# Patient Record
Sex: FEMALE | Race: WHITE | NOT HISPANIC OR LATINO | URBAN - METROPOLITAN AREA
[De-identification: names, ages, dates, MRNs, and addresses within clinical notes are randomized per-mention and may not be internally consistent; named-entity substitution may affect disease eponyms.]

---

## 2017-02-16 ENCOUNTER — APPOINTMENT (RX ONLY)
Dept: URBAN - METROPOLITAN AREA CLINIC 156 | Facility: CLINIC | Age: 62
Setting detail: DERMATOLOGY
End: 2017-02-16

## 2017-02-16 DIAGNOSIS — Z41.9 ENCOUNTER FOR PROCEDURE FOR PURPOSES OTHER THAN REMEDYING HEALTH STATE, UNSPECIFIED: ICD-10-CM

## 2017-02-16 PROCEDURE — ? FILLERS

## 2017-02-16 PROCEDURE — ? BOTOX

## 2017-02-16 NOTE — PROCEDURE: FILLERS
Tear Troughs Filler  Volume In Cc: 0
Use Map Statement For Sites (Optional): No
Filler: Juvederm Ultra Plus XC
Post-Care Instructions: Patient instructed to apply ice to reduce swelling.
Additional Anesthesia Volume In Cc: 6
Consent: Written consent obtained. Risks include but not limited to bruising, beading, irregular texture, ulceration, infection, allergic reaction, scar formation, incomplete augmentation, temporary nature, procedural pain.
Detail Level: Detailed
Map Statment: See Attach Map for Complete Details
Anesthesia Volume In Cc: 0.5
Topical Anesthesia?: 23% lidocaine, 7% tetracaine
Expiration Date (Month Year): 9/18
Lot #: [id number]

## 2017-02-16 NOTE — PROCEDURE: BOTOX
Dilution (U/0.1 Cc): 2.5
Additional Area 1 Units: 0
Detail Level: Detailed
Glabellar Complex Units: 8
Post-Care Instructions: Patient instructed to not lie down for 4 hours and limit physical activity for 24 hours. Patient instructed not to travel by airplane for 48 hours.
Reconstitution Date (Optional): 2/16/17
Expiration Date (Month Year): 9/19
Inferior Lateral Orbicularis Oculi Units: 7
Forehead Units: 4.5
Consent: Written consent obtained. Risks include but not limited to lid/brow ptosis, bruising, swelling, diplopia, temporary effect, incomplete chemical denervation.
Lot #: [id number]

## 2017-04-27 ENCOUNTER — APPOINTMENT (RX ONLY)
Dept: URBAN - METROPOLITAN AREA CLINIC 156 | Facility: CLINIC | Age: 62
Setting detail: DERMATOLOGY
End: 2017-04-27

## 2017-04-27 DIAGNOSIS — Z41.9 ENCOUNTER FOR PROCEDURE FOR PURPOSES OTHER THAN REMEDYING HEALTH STATE, UNSPECIFIED: ICD-10-CM

## 2017-04-27 PROCEDURE — ? BOTOX

## 2017-04-27 PROCEDURE — ? FILLERS

## 2017-04-27 NOTE — PROCEDURE: BOTOX
Inferior Lateral Orbicularis Oculi Units: 0
Forehead Units: 5
Detail Level: Detailed
Dilution (U/0.1 Cc): 4
Reconstitution Date (Optional): 4/25/17
Expiration Date (Month Year): 9/18
Lot #: [id number]
Consent: Written consent obtained. Risks include but not limited to lid/brow ptosis, bruising, swelling, diplopia, temporary effect, incomplete chemical denervation.
Post-Care Instructions: Patient instructed to not lie down for 4 hours and limit physical activity for 24 hours. Patient instructed not to travel by airplane for 48 hours.

## 2017-04-27 NOTE — PROCEDURE: FILLERS
Detail Level: Detailed
Map Statment: See Attach Map for Complete Details
Use Map Statement For Sites (Optional): No
Additional Anesthesia Volume In Cc: 6
Topical Anesthesia?: 23% lidocaine, 7% tetracaine
Filler: Juvederm Ultra Plus XC
Lateral Face Filler  Volume In Cc: 0
Marionette Lines Filler  Volume In Cc: 0.8
Nasolabial Folds Filler Volume In Cc: 0.2
Expiration Date (Month Year): 07/18
Post-Care Instructions: Patient instructed to apply ice to reduce swelling.
Lot #: [id number]
Consent: Written consent obtained. Risks include but not limited to bruising, beading, irregular texture, ulceration, infection, allergic reaction, scar formation, incomplete augmentation, temporary nature, procedural pain.

## 2017-10-05 ENCOUNTER — APPOINTMENT (RX ONLY)
Dept: URBAN - METROPOLITAN AREA CLINIC 156 | Facility: CLINIC | Age: 62
Setting detail: DERMATOLOGY
End: 2017-10-05

## 2017-10-05 DIAGNOSIS — Z41.9 ENCOUNTER FOR PROCEDURE FOR PURPOSES OTHER THAN REMEDYING HEALTH STATE, UNSPECIFIED: ICD-10-CM

## 2017-10-05 PROCEDURE — ? BOTOX

## 2017-10-05 PROCEDURE — ? FILLERS

## 2017-10-05 NOTE — PROCEDURE: FILLERS
Additional Area 3 Volume In Cc: 0
Lot #: [id number]
Use Map Statement For Sites (Optional): No
Nasolabial Folds Filler Volume In Cc: 0.4
Map Statment: See Attach Map for Complete Details
Post-Care Instructions: Patient instructed to apply ice to reduce swelling.
Filler: Juvederm Vollure XC
Anesthesia Volume In Cc: 0.5
Consent: Written consent obtained. Risks include but not limited to bruising, beading, irregular texture, ulceration, infection, allergic reaction, scar formation, incomplete augmentation, temporary nature, procedural pain.
Additional Anesthesia Volume In Cc: 6
Detail Level: Detailed
Expiration Date (Month Year): 3/12/19
Marionette Lines Filler  Volume In Cc: 0.6

## 2017-10-05 NOTE — PROCEDURE: BOTOX
Additional Area 2 Units: 0
Forehead Units: 4.5
Dilution (U/0.1 Cc): 2.5
Glabellar Complex Units: 12.5
Post-Care Instructions: Patient instructed to not lie down for 4 hours and limit physical activity for 24 hours. Patient instructed not to travel by airplane for 48 hours.
Detail Level: Detailed
Consent: Written consent obtained. Risks include but not limited to lid/brow ptosis, bruising, swelling, diplopia, temporary effect, incomplete chemical denervation.

## 2018-02-22 ENCOUNTER — APPOINTMENT (RX ONLY)
Dept: URBAN - METROPOLITAN AREA CLINIC 156 | Facility: CLINIC | Age: 63
Setting detail: DERMATOLOGY
End: 2018-02-22

## 2018-02-22 DIAGNOSIS — Z41.9 ENCOUNTER FOR PROCEDURE FOR PURPOSES OTHER THAN REMEDYING HEALTH STATE, UNSPECIFIED: ICD-10-CM

## 2018-02-22 PROCEDURE — ? BOTOX

## 2018-02-22 PROCEDURE — ? FILLERS

## 2018-02-22 NOTE — PROCEDURE: BOTOX
Glabellar Complex Units: 12.5
Additional Area 5 Units: 0
Post-Care Instructions: Patient instructed to not lie down for 4 hours and limit physical activity for 24 hours. Patient instructed not to travel by airplane for 48 hours.
Detail Level: Detailed
Consent: Written consent obtained. Risks include but not limited to lid/brow ptosis, bruising, swelling, diplopia, temporary effect, incomplete chemical denervation.
Dilution (U/0.1 Cc): 2.5
Forehead Units: 4.5

## 2018-02-22 NOTE — PROCEDURE: FILLERS
Tear Troughs Filler  Volume In Cc: 0
Detail Level: Detailed
Nasolabial Folds Filler Volume In Cc: 0.2
Anesthesia Volume In Cc: 0.5
Filler: Juvederm Vollure XC
Post-Care Instructions: Patient instructed to apply ice to reduce swelling.
Use Map Statement For Sites (Optional): No
Additional Anesthesia Volume In Cc: 6
Marionette Lines Filler  Volume In Cc: 0.8
Consent: Written consent obtained. Risks include but not limited to bruising, beading, irregular texture, ulceration, infection, allergic reaction, scar formation, incomplete augmentation, temporary nature, procedural pain.
Map Statment: See Attach Map for Complete Details

## 2018-03-19 ENCOUNTER — APPOINTMENT (RX ONLY)
Dept: URBAN - METROPOLITAN AREA CLINIC 157 | Facility: CLINIC | Age: 63
Setting detail: DERMATOLOGY
End: 2018-03-19

## 2018-03-19 DIAGNOSIS — Z41.9 ENCOUNTER FOR PROCEDURE FOR PURPOSES OTHER THAN REMEDYING HEALTH STATE, UNSPECIFIED: ICD-10-CM

## 2018-03-19 PROCEDURE — ? FILLERS

## 2018-03-19 NOTE — PROCEDURE: FILLERS
Additional Area 2 Volume In Cc: 0
Use Map Statement For Sites (Optional): No
Filler: Juvederm Vollure XC
Additional Area 1 Location: Upper cutaneous lip
Detail Level: Detailed
Marionette Lines Filler  Volume In Cc: 0.9
Map Statment: See Attach Map for Complete Details
Expiration Date (Month Year): 09-30-19
Post-Care Instructions: Patient instructed to apply ice to reduce swelling.
Lot #: [id number]
Additional Area 1 Volume In Cc: 0.1
Consent: Written consent obtained. Risks include but not limited to bruising, beading, irregular texture, ulceration, infection, allergic reaction, scar formation, incomplete augmentation, temporary nature, procedural pain.
Topical Anesthesia?: 23% lidocaine, 7% tetracaine

## 2018-03-27 ENCOUNTER — APPOINTMENT (RX ONLY)
Dept: URBAN - METROPOLITAN AREA CLINIC 156 | Facility: CLINIC | Age: 63
Setting detail: DERMATOLOGY
End: 2018-03-27

## 2018-03-27 DIAGNOSIS — Z41.9 ENCOUNTER FOR PROCEDURE FOR PURPOSES OTHER THAN REMEDYING HEALTH STATE, UNSPECIFIED: ICD-10-CM

## 2018-03-27 PROCEDURE — ? COSMETIC CONSULTATION: HAIR REMOVAL

## 2018-04-23 ENCOUNTER — APPOINTMENT (RX ONLY)
Dept: URBAN - METROPOLITAN AREA CLINIC 156 | Facility: CLINIC | Age: 63
Setting detail: DERMATOLOGY
End: 2018-04-23

## 2018-04-23 DIAGNOSIS — Z41.9 ENCOUNTER FOR PROCEDURE FOR PURPOSES OTHER THAN REMEDYING HEALTH STATE, UNSPECIFIED: ICD-10-CM

## 2018-04-23 PROCEDURE — ? LASER HAIR REMOVAL

## 2018-04-23 NOTE — PROCEDURE: LASER HAIR REMOVAL
Total Pulses: 286
Laser Type: Alexandrite 755nm
Treatment Number: 0
Post-Care Instructions: I reviewed with the patient in detail post-care instructions. Patient should avoid sun for a minimum of 4 weeks before and after treatment.
Pulse Duration: 3 ms
Detail Level: Generalized
Pulse Duration: 0.45 ms
Cooling: DCD setting
Treatment Number: 1
Render Post-Care In The Note: No
Spot Size: 18 mm
Consent: Written consent obtained, risks reviewed including but not limited to crusting, scabbing, blistering, scarring, darker or lighter pigmentary change, paradoxical hair regrowth, incomplete removal of hair and infection.
Fluence (Will Not Render If 0): 20
Fluence (Will Not Render If 0): 20
Pre-Procedure: Prior to proceeding the treatment areas were cleaned and all present put on their eye protection.
Cooling: DCD 40/30
Fluence (Will Not Render If 0): 12
Cooling Override: 23
Number Of Prepaid Treatments (Will Not Render If 0): 6
Device Serial Number (Optional): [id number]
Total Pulses: 595
Spot Size: 1.5 mm
Fluence (Will Not Render If 0): 10
Post-Procedure Care: Immediate endpoint: perifollicular erythema and edema. Laser Gel or Extreme Protect were applied post laser. Post care reviewed with patient.

## 2018-06-04 ENCOUNTER — APPOINTMENT (RX ONLY)
Dept: URBAN - METROPOLITAN AREA CLINIC 156 | Facility: CLINIC | Age: 63
Setting detail: DERMATOLOGY
End: 2018-06-04

## 2018-06-04 DIAGNOSIS — Z41.9 ENCOUNTER FOR PROCEDURE FOR PURPOSES OTHER THAN REMEDYING HEALTH STATE, UNSPECIFIED: ICD-10-CM

## 2018-06-04 PROCEDURE — ? LASER HAIR REMOVAL

## 2018-06-04 NOTE — PROCEDURE: LASER HAIR REMOVAL
Treatment Number: 0
Number Of Prepaid Treatments (Will Not Render If 0): 6
Post-Care Instructions: I reviewed with the patient in detail post-care instructions. Patient should avoid sun for a minimum of 4 weeks before and after treatment.
Fluence (Will Not Render If 0): 20
Pulse Duration: 0.45 ms
Laser Type: Alexandrite 755nm
Spot Size: 18 mm
Pulse Duration: 3 ms
Spot Size: 1.5 mm
Detail Level: Generalized
Pre-Procedure: Prior to proceeding the treatment areas were cleaned and all present put on their eye protection.
Fluence (Will Not Render If 0): 10
Cooling: DCD setting
Render Post-Care In The Note: No
Treatment Number: 2
Cooling: DCD 40/20
Fluence (Will Not Render If 0): 20
Cooling Override: 23
Consent: Written consent obtained, risks reviewed including but not limited to crusting, scabbing, blistering, scarring, darker or lighter pigmentary change, paradoxical hair regrowth, incomplete removal of hair and infection.
Fluence (Will Not Render If 0): 12
Total Pulses: 286
Post-Procedure Care: Immediate endpoint: perifollicular erythema and edema. Laser Gel or Extreme Protect were applied post laser. Post care reviewed with patient.
Total Pulses: 295
Cooling: DCD 40/30
Device Serial Number (Optional): [id number]

## 2018-07-09 ENCOUNTER — APPOINTMENT (RX ONLY)
Dept: URBAN - METROPOLITAN AREA CLINIC 156 | Facility: CLINIC | Age: 63
Setting detail: DERMATOLOGY
End: 2018-07-09

## 2018-07-09 DIAGNOSIS — Z41.9 ENCOUNTER FOR PROCEDURE FOR PURPOSES OTHER THAN REMEDYING HEALTH STATE, UNSPECIFIED: ICD-10-CM

## 2018-07-09 PROCEDURE — ? LASER HAIR REMOVAL

## 2018-07-09 NOTE — PROCEDURE: LASER HAIR REMOVAL
Total Pulses: 286
Laser Type: Alexandrite 755nm
Cooling: DCD setting
Render Post-Care In The Note: No
Pulse Duration: 3 ms
Cooling: DCD 40/30
Number Of Prepaid Treatments (Will Not Render If 0): 0
Device Serial Number (Optional): [id number]
Spot Size: 18 mm
Fluence (Will Not Render If 0): 20
Fluence (Will Not Render If 0): 12
Pulse Duration: 0.45 ms
Fluence (Will Not Render If 0): 10
Pre-Procedure: Prior to proceeding the treatment areas were cleaned and all present put on their eye protection.
Post-Procedure Care: Immediate endpoint: perifollicular erythema and edema. Laser Gel or Extreme Protect were applied post laser. Post care reviewed with patient.
Spot Size: 1.5 mm
Detail Level: Generalized
Cooling: DCD 40/20
Total Pulses: 235
Cooling Override: 81
Post-Care Instructions: I reviewed with the patient in detail post-care instructions. Patient should avoid sun for a minimum of 4 weeks before and after treatment.
Fluence (Will Not Render If 0): 20
Consent: Written consent obtained, risks reviewed including but not limited to crusting, scabbing, blistering, scarring, darker or lighter pigmentary change, paradoxical hair regrowth, incomplete removal of hair and infection.

## 2018-08-09 ENCOUNTER — APPOINTMENT (RX ONLY)
Dept: URBAN - METROPOLITAN AREA CLINIC 156 | Facility: CLINIC | Age: 63
Setting detail: DERMATOLOGY
End: 2018-08-09

## 2018-08-09 DIAGNOSIS — Z41.9 ENCOUNTER FOR PROCEDURE FOR PURPOSES OTHER THAN REMEDYING HEALTH STATE, UNSPECIFIED: ICD-10-CM

## 2018-08-09 PROCEDURE — ? BOTOX

## 2018-08-09 PROCEDURE — ? FILLERS

## 2018-08-09 NOTE — PROCEDURE: FILLERS
Anesthesia Volume In Cc: 0
Lot #: [id number]
Include Cannula Information In Note?: No
Consent: Written consent obtained. Risks include but not limited to bruising, beading, irregular texture, ulceration, infection, allergic reaction, scar formation, incomplete augmentation, temporary nature, procedural pain.
Topical Anesthesia?: 23% lidocaine, 7% tetracaine
Expiration Date (Month Year): 12-
Post-Care Instructions: Patient instructed to apply ice to reduce swelling.
Additional Area 1 Location: Upper cutaneous lip
Map Statment: See Attach Map for Complete Details
Filler: Juvederm Vollure XC
Detail Level: Detailed
Marionette Lines Filler  Volume In Cc: 0.9
Additional Area 1 Volume In Cc: 0.1

## 2018-08-09 NOTE — PROCEDURE: BOTOX
Expiration Date (Month Year): 03/21
Additional Area 4 Units: 0
Glabellar Complex Units: 12.5
Dilution (U/0.1 Cc): 2.5
Post-Care Instructions: Patient instructed to not lie down for 4 hours and limit physical activity for 24 hours. Patient instructed not to travel by airplane for 48 hours.
Forehead Units: 4.5
Consent: Written consent obtained. Risks include but not limited to lid/brow ptosis, bruising, swelling, diplopia, temporary effect, incomplete chemical denervation.
Lot #: [id number]
Detail Level: Detailed

## 2018-08-20 ENCOUNTER — APPOINTMENT (RX ONLY)
Dept: URBAN - METROPOLITAN AREA CLINIC 156 | Facility: CLINIC | Age: 63
Setting detail: DERMATOLOGY
End: 2018-08-20

## 2018-08-20 DIAGNOSIS — Z41.9 ENCOUNTER FOR PROCEDURE FOR PURPOSES OTHER THAN REMEDYING HEALTH STATE, UNSPECIFIED: ICD-10-CM

## 2018-08-20 PROCEDURE — ? LASER HAIR REMOVAL

## 2018-08-20 NOTE — PROCEDURE: LASER HAIR REMOVAL
Cooling Override: 81
Fluence (Will Not Render If 0): 20
Device Serial Number (Optional): [id number]
External Cooling Fan Speed: 0
Pulse Duration: 0.45 ms
Pulse Duration: 3 ms
Laser Type: Alexandrite 755nm
Cooling: DCD setting
Total Pulses: 286
Spot Size: 18 mm
Cooling: DCD 40/20
Cooling: DCD 40/30
Render Post-Care In The Note: No
Post-Care Instructions: I reviewed with the patient in detail post-care instructions. Patient should avoid sun for a minimum of 4 weeks before and after treatment.
Spot Size: 1.5 mm
Fluence (Will Not Render If 0): 10
Fluence (Will Not Render If 0): 3
Treatment Number: 4
Fluence (Will Not Render If 0): 20
Pre-Procedure: Prior to proceeding the treatment areas were cleaned and all present put on their eye protection.
Consent: Written consent obtained, risks reviewed including but not limited to crusting, scabbing, blistering, scarring, darker or lighter pigmentary change, paradoxical hair regrowth, incomplete removal of hair and infection.
Post-Procedure Care: Immediate endpoint: perifollicular erythema and edema. Laser Gel or Extreme Protect were applied post laser. Post care reviewed with patient.
Total Pulses: 320
Detail Level: Generalized

## 2018-08-30 ENCOUNTER — APPOINTMENT (RX ONLY)
Dept: URBAN - METROPOLITAN AREA CLINIC 156 | Facility: CLINIC | Age: 63
Setting detail: DERMATOLOGY
End: 2018-08-30

## 2018-08-30 DIAGNOSIS — L259 CONTACT DERMATITIS AND OTHER ECZEMA, UNSPECIFIED CAUSE: ICD-10-CM

## 2018-08-30 PROBLEM — L23.9 ALLERGIC CONTACT DERMATITIS, UNSPECIFIED CAUSE: Status: ACTIVE | Noted: 2018-08-30

## 2018-08-30 PROCEDURE — 99212 OFFICE O/P EST SF 10 MIN: CPT

## 2018-08-30 PROCEDURE — ? COUNSELING

## 2018-08-30 PROCEDURE — ? PRESCRIPTION

## 2018-08-30 RX ORDER — CLOBETASOL PROPIONATE 0.5 MG/G
CREAM TOPICAL BID
Qty: 1 | Refills: 1 | Status: ERX | COMMUNITY
Start: 2018-08-30

## 2018-08-30 RX ADMIN — CLOBETASOL PROPIONATE: 0.5 CREAM TOPICAL at 00:00

## 2018-08-30 ASSESSMENT — LOCATION SIMPLE DESCRIPTION DERM: LOCATION SIMPLE: CHEST

## 2018-08-30 ASSESSMENT — LOCATION ZONE DERM: LOCATION ZONE: TRUNK

## 2018-08-30 ASSESSMENT — LOCATION DETAILED DESCRIPTION DERM: LOCATION DETAILED: UPPER STERNUM

## 2018-09-04 ENCOUNTER — RX ONLY (OUTPATIENT)
Age: 63
Setting detail: RX ONLY
End: 2018-09-04

## 2018-09-04 RX ORDER — VALACYCLOVIR 1 G/1
TABLET ORAL
Qty: 21 | Refills: 0 | Status: ERX | COMMUNITY
Start: 2018-09-04

## 2018-09-07 ENCOUNTER — RX ONLY (OUTPATIENT)
Age: 63
Setting detail: RX ONLY
End: 2018-09-07

## 2018-09-07 RX ORDER — METHYLPREDNISOLONE 4 MG/1
TABLET ORAL
Qty: 1 | Refills: 0 | Status: ERX | COMMUNITY
Start: 2018-09-07

## 2018-10-01 ENCOUNTER — APPOINTMENT (RX ONLY)
Dept: URBAN - METROPOLITAN AREA CLINIC 156 | Facility: CLINIC | Age: 63
Setting detail: DERMATOLOGY
End: 2018-10-01

## 2018-10-01 DIAGNOSIS — Z41.9 ENCOUNTER FOR PROCEDURE FOR PURPOSES OTHER THAN REMEDYING HEALTH STATE, UNSPECIFIED: ICD-10-CM

## 2018-10-01 PROCEDURE — ? LASER HAIR REMOVAL

## 2018-10-01 NOTE — PROCEDURE: LASER HAIR REMOVAL
Fluence (Will Not Render If 0): 20
Treatment Number: 0
Cooling: DCD 40/30
Consent: Written consent obtained, risks reviewed including but not limited to crusting, scabbing, blistering, scarring, darker or lighter pigmentary change, paradoxical hair regrowth, incomplete removal of hair and infection.
Total Pulses: 306
Render Post-Care In The Note: No
Post-Procedure Care: Immediate endpoint: perifollicular erythema and edema. Laser Gel or Extreme Protect were applied post laser. Post care reviewed with patient.
Spot Size: 1.5 mm
Detail Level: Generalized
Spot Size: 18 mm
Fluence (Will Not Render If 0): 14
Cooling: DCD setting
Pulse Duration: 0.45 ms
Total Pulses: 286
Fluence (Will Not Render If 0): 20
Fluence (Will Not Render If 0): 10
Device Serial Number (Optional): [id number]
Post-Care Instructions: I reviewed with the patient in detail post-care instructions. Patient should avoid sun for a minimum of 4 weeks before and after treatment.
Cooling: DCD 40/20
Pulse Duration: 3 ms
Cooling Override: 81
Pre-Procedure: Prior to proceeding the treatment areas were cleaned and all present put on their eye protection.
Laser Type: Alexandrite 755nm

## 2018-11-14 ENCOUNTER — APPOINTMENT (RX ONLY)
Dept: URBAN - METROPOLITAN AREA CLINIC 156 | Facility: CLINIC | Age: 63
Setting detail: DERMATOLOGY
End: 2018-11-14

## 2018-11-14 DIAGNOSIS — Z41.9 ENCOUNTER FOR PROCEDURE FOR PURPOSES OTHER THAN REMEDYING HEALTH STATE, UNSPECIFIED: ICD-10-CM

## 2018-11-14 PROCEDURE — ? LASER HAIR REMOVAL

## 2018-11-14 NOTE — PROCEDURE: LASER HAIR REMOVAL
Cooling Override: 81
Spot Size: 1.5 mm
Consent: Written consent obtained, risks reviewed including but not limited to crusting, scabbing, blistering, scarring, darker or lighter pigmentary change, paradoxical hair regrowth, incomplete removal of hair and infection.
Detail Level: Generalized
Fluence (Will Not Render If 0): 20
Anesthesia Volume In Cc: 0
Pulse Duration: 0.45 ms
Fluence (Will Not Render If 0): 20
Pulse Duration: 3 ms
Laser Type: Alexandrite 755nm
Total Pulses: 286
Cooling: DCD 40/30
Spot Size: 18 mm
Total Pulses: 176
Pre-Procedure: Prior to proceeding the treatment areas were cleaned and all present put on their eye protection.
Cooling: DCD setting
Post-Care Instructions: I reviewed with the patient in detail post-care instructions. Patient should avoid sun for a minimum of 4 weeks before and after treatment.
Post-Procedure Care: Immediate endpoint: perifollicular erythema and edema. Laser Gel or Extreme Protect were applied post laser. Post care reviewed with patient.
Fluence (Will Not Render If 0): 10
Render Post-Care In The Note: No
Device Serial Number (Optional): [id number]
Fluence (Will Not Render If 0): 14

## 2018-11-15 ENCOUNTER — APPOINTMENT (RX ONLY)
Dept: URBAN - METROPOLITAN AREA CLINIC 156 | Facility: CLINIC | Age: 63
Setting detail: DERMATOLOGY
End: 2018-11-15

## 2018-11-15 DIAGNOSIS — Z41.9 ENCOUNTER FOR PROCEDURE FOR PURPOSES OTHER THAN REMEDYING HEALTH STATE, UNSPECIFIED: ICD-10-CM

## 2018-11-15 PROCEDURE — ? FILLERS

## 2018-11-15 PROCEDURE — ? BOTOX

## 2018-11-15 NOTE — PROCEDURE: FILLERS
Include Cannula Information In Note?: No
Jawline Filler Volume In Cc: 0
Nasolabial Folds Filler Volume In Cc: 0.3
Topical Anesthesia?: 23% lidocaine, 7% tetracaine
Consent: Written consent obtained. Risks include but not limited to bruising, beading, irregular texture, ulceration, infection, allergic reaction, scar formation, incomplete augmentation, temporary nature, procedural pain.
Expiration Date (Month Year): 06-
Post-Care Instructions: Patient instructed to apply ice to reduce swelling.
Lot #: [id number]
Marionette Lines Filler  Volume In Cc: 0.7
Filler: Juvederm Vollure XC
Detail Level: Detailed
Map Statment: See Attach Map for Complete Details
Cheeks Filler Volume In Cc: 1
Expiration Date (Month Year): 09-
Map Statment: See Attach Map for Complete Details
Filler: Juvederm Voluma XC
Lot #: [id number]

## 2018-11-15 NOTE — PROCEDURE: BOTOX
Lateral Platysmal Bands Units: 0
Dilution (U/0.1 Cc): 2.5
Detail Level: Detailed
Reconstitution Date (Optional): 11/15/2018
Lot #: [id number]
Consent: Written consent obtained. Risks include but not limited to lid/brow ptosis, bruising, swelling, diplopia, temporary effect, incomplete chemical denervation.
Expiration Date (Month Year): 05/2021
Post-Care Instructions: Patient instructed to not lie down for 4 hours and limit physical activity for 24 hours. Patient instructed not to travel by airplane for 48 hours.

## 2019-04-18 ENCOUNTER — APPOINTMENT (RX ONLY)
Dept: URBAN - METROPOLITAN AREA CLINIC 156 | Facility: CLINIC | Age: 64
Setting detail: DERMATOLOGY
End: 2019-04-18

## 2019-04-18 DIAGNOSIS — Z41.9 ENCOUNTER FOR PROCEDURE FOR PURPOSES OTHER THAN REMEDYING HEALTH STATE, UNSPECIFIED: ICD-10-CM

## 2019-04-18 PROCEDURE — ? BOTOX

## 2019-04-18 PROCEDURE — ? FILLERS

## 2019-04-18 NOTE — PROCEDURE: BOTOX
Post-Care Instructions: Patient instructed to not lie down for 4 hours and limit physical activity for 24 hours. Patient instructed not to travel by airplane for 48 hours.
Inferior Lateral Orbicularis Oculi Units: 0
Detail Level: Detailed
Consent: Written consent obtained. Risks include but not limited to lid/brow ptosis, bruising, swelling, diplopia, temporary effect, incomplete chemical denervation.
Dilution (U/0.1 Cc): 2.5
Reconstitution Date (Optional): 04/17/2019
Lot #: [id number]
Expiration Date (Month Year): 08/2021

## 2019-04-18 NOTE — PROCEDURE: FILLERS
Expiration Date (Month Year): 02-
Mid Face Filler  Volume In Cc: 0
Lot #: [id number]
Detail Level: Detailed
Include Cannula Information In Note?: No
Consent: Written consent obtained. Risks include but not limited to bruising, beading, irregular texture, ulceration, infection, allergic reaction, scar formation, incomplete augmentation, temporary nature, procedural pain.
Post-Care Instructions: Patient instructed to apply ice to reduce swelling.
Map Statment: See Attach Map for Complete Details
Marionette Lines Filler  Volume In Cc: 0.7
Filler: Juvederm Ultra Plus
Topical Anesthesia?: 23% lidocaine, 7% tetracaine
Nasolabial Folds Filler Volume In Cc: 0.3

## 2019-05-20 ENCOUNTER — APPOINTMENT (RX ONLY)
Dept: URBAN - METROPOLITAN AREA CLINIC 157 | Facility: CLINIC | Age: 64
Setting detail: DERMATOLOGY
End: 2019-05-20

## 2019-05-20 DIAGNOSIS — L72.0 EPIDERMAL CYST: ICD-10-CM

## 2019-05-20 DIAGNOSIS — L73.8 OTHER SPECIFIED FOLLICULAR DISORDERS: ICD-10-CM

## 2019-05-20 PROBLEM — D23.5 OTHER BENIGN NEOPLASM OF SKIN OF TRUNK: Status: ACTIVE | Noted: 2019-05-20

## 2019-05-20 PROCEDURE — ? COSMETIC EXTRACTIONS

## 2019-05-20 PROCEDURE — 99213 OFFICE O/P EST LOW 20 MIN: CPT

## 2019-05-20 PROCEDURE — ? COUNSELING

## 2019-05-20 ASSESSMENT — LOCATION ZONE DERM
LOCATION ZONE: SCALP
LOCATION ZONE: FACE

## 2019-05-20 ASSESSMENT — LOCATION DETAILED DESCRIPTION DERM
LOCATION DETAILED: RIGHT CENTRAL POSTAURICULAR SKIN
LOCATION DETAILED: RIGHT FOREHEAD

## 2019-05-20 ASSESSMENT — LOCATION SIMPLE DESCRIPTION DERM
LOCATION SIMPLE: SCALP
LOCATION SIMPLE: RIGHT FOREHEAD

## 2019-05-20 NOTE — PROCEDURE: COSMETIC EXTRACTIONS
Render The Number Of Extractions: No
Price (Use Numbers Only, No Special Characters Or $): 0
Post-Care Instructions: I reviewed with the patient in detail post-care instructions. Patient is to wear sunprotection, and avoid picking at any of the treated lesions. Pt may apply Vaseline to crusted or scabbing areas.
Consent: The patient's consent was obtained including but not limited to risks of crusting, scabbing, blistering, scarring, darker or lighter pigmentary change, recurrence, incomplete removal and infection.
Detail Level: Simple

## 2019-08-15 ENCOUNTER — APPOINTMENT (RX ONLY)
Dept: URBAN - METROPOLITAN AREA CLINIC 156 | Facility: CLINIC | Age: 64
Setting detail: DERMATOLOGY
End: 2019-08-15

## 2019-08-15 DIAGNOSIS — Z41.9 ENCOUNTER FOR PROCEDURE FOR PURPOSES OTHER THAN REMEDYING HEALTH STATE, UNSPECIFIED: ICD-10-CM

## 2019-08-15 PROCEDURE — ? FILLERS

## 2019-08-15 NOTE — PROCEDURE: FILLERS
Lot #: [id number]
Decollete Filler  Volume In Cc: 0
Use Map Statement For Sites (Optional): No
Marionette Lines Filler  Volume In Cc: 1
Map Statment: See Attach Map for Complete Details
Expiration Date (Month Year): 03-
Anesthesia Volume In Cc: 0.5
Additional Anesthesia Volume In Cc: 6
Consent: Written consent obtained. Risks include but not limited to bruising, beading, irregular texture, ulceration, infection, allergic reaction, scar formation, incomplete augmentation, temporary nature, procedural pain.
Topical Anesthesia?: 23% lidocaine, 7% tetracaine
Post-Care Instructions: Patient instructed to apply ice to reduce swelling.
Filler: Restylane Defyne
Detail Level: Detailed

## 2020-07-28 ENCOUNTER — APPOINTMENT (RX ONLY)
Dept: URBAN - METROPOLITAN AREA CLINIC 156 | Facility: CLINIC | Age: 65
Setting detail: DERMATOLOGY
End: 2020-07-28

## 2020-07-28 DIAGNOSIS — Z41.9 ENCOUNTER FOR PROCEDURE FOR PURPOSES OTHER THAN REMEDYING HEALTH STATE, UNSPECIFIED: ICD-10-CM

## 2020-07-28 PROCEDURE — ? BOTOX

## 2020-07-28 PROCEDURE — ? FILLERS

## 2020-07-28 NOTE — PROCEDURE: FILLERS
Lot #: [id number]
Decollete Filler  Volume In Cc: 0
Use Map Statement For Sites (Optional): No
Marionette Lines Filler  Volume In Cc: 1
Map Statment: See Attach Map for Complete Details
Expiration Date (Month Year): 2021-06-17
Anesthesia Volume In Cc: 0.5
Additional Anesthesia Volume In Cc: 6
Consent: Written consent obtained. Risks include but not limited to bruising, beading, irregular texture, ulceration, infection, allergic reaction, scar formation, incomplete augmentation, temporary nature, procedural pain.
Topical Anesthesia?: 23% lidocaine, 7% tetracaine
Post-Care Instructions: Patient instructed to apply ice to reduce swelling.
Filler: Restylane Defyne
Detail Level: Detailed

## 2020-07-28 NOTE — PROCEDURE: BOTOX
Post-Care Instructions: Patient instructed to not lie down for 4 hours and limit physical activity for 24 hours. Patient instructed not to travel by airplane for 48 hours.
Inferior Lateral Orbicularis Oculi Units: 0
Detail Level: Detailed
Consent: Written consent obtained. Risks include but not limited to lid/brow ptosis, bruising, swelling, diplopia, temporary effect, incomplete chemical denervation.
Dilution (U/0.1 Cc): 2.5
Reconstitution Date (Optional): 07/26/20
Lot #: [id number]
Expiration Date (Month Year): 03/2023

## 2021-02-13 ENCOUNTER — RX ONLY (OUTPATIENT)
Age: 66
Setting detail: RX ONLY
End: 2021-02-13

## 2021-02-13 RX ORDER — BACITRACIN 500 [USP'U]/G
OINTMENT OPHTHALMIC
Qty: 1 | Refills: 0 | Status: ERX | COMMUNITY
Start: 2021-02-13

## 2021-02-13 RX ORDER — VALACYCLOVIR HYDROCHLORIDE 1 G/1
TABLET, FILM COATED ORAL
Qty: 21 | Refills: 0 | Status: ERX | COMMUNITY
Start: 2021-02-13

## 2021-04-13 ENCOUNTER — APPOINTMENT (RX ONLY)
Dept: URBAN - METROPOLITAN AREA CLINIC 156 | Facility: CLINIC | Age: 66
Setting detail: DERMATOLOGY
End: 2021-04-13

## 2021-04-13 VITALS — WEIGHT: 150 LBS | HEIGHT: 64 IN

## 2021-04-13 DIAGNOSIS — Z41.9 ENCOUNTER FOR PROCEDURE FOR PURPOSES OTHER THAN REMEDYING HEALTH STATE, UNSPECIFIED: ICD-10-CM

## 2021-04-13 PROCEDURE — ? BOTOX

## 2021-04-13 NOTE — PROCEDURE: BOTOX
Show Masseter Units: Yes
Additional Area 3 Units: 0
Show Right And Left Pupillary Line Units: No
Dilution (U/0.1 Cc): 2.5
Galo
Post-Care Instructions: Patient instructed to not lie down for 4 hours and limit physical activity for 24 hours. Patient instructed not to travel by airplane for 48 hours.
Detail Level: Detailed
LAUREN Jessica MD
Consent: Written consent obtained. Risks include but not limited to lid/brow ptosis, bruising, swelling, diplopia, temporary effect, incomplete chemical denervation.

## 2021-08-23 ENCOUNTER — APPOINTMENT (RX ONLY)
Dept: URBAN - METROPOLITAN AREA CLINIC 157 | Facility: CLINIC | Age: 66
Setting detail: DERMATOLOGY
End: 2021-08-23

## 2021-08-23 DIAGNOSIS — Z41.9 ENCOUNTER FOR PROCEDURE FOR PURPOSES OTHER THAN REMEDYING HEALTH STATE, UNSPECIFIED: ICD-10-CM

## 2021-08-23 PROCEDURE — ? FILLERS

## 2021-08-23 PROCEDURE — ? BOTOX

## 2021-08-23 NOTE — PROCEDURE: FILLERS
Lot #: [id number]
Decollete Filler  Volume In Cc: 0
Use Map Statement For Sites (Optional): No
Marionette Lines Filler  Volume In Cc: 1
Map Statment: See Attach Map for Complete Details
Expiration Date (Month Year): 2021-06-17
Anesthesia Volume In Cc: 0.5
Additional Anesthesia Volume In Cc: 6
Consent: Written consent obtained. Risks include but not limited to bruising, beading, irregular texture, ulceration, infection, allergic reaction, scar formation, incomplete augmentation, temporary nature, procedural pain.
Topical Anesthesia?: 23% lidocaine, 7% tetracaine
Post-Care Instructions: Patient instructed to apply ice to reduce swelling.
Filler: Juvederm Ultra Plus XC
Detail Level: Detailed

## 2021-08-23 NOTE — PROCEDURE: BOTOX
Show Masseter Units: Yes
Additional Area 3 Units: 0
Show Right And Left Pupillary Line Units: No
Dilution (U/0.1 Cc): 2.5
Post-Care Instructions: Patient instructed to not lie down for 4 hours and limit physical activity for 24 hours. Patient instructed not to travel by airplane for 48 hours.
Detail Level: Detailed
Lot #: [id number]
Reconstitution Date (Optional): 7/27/2021
Consent: Written consent obtained. Risks include but not limited to lid/brow ptosis, bruising, swelling, diplopia, temporary effect, incomplete chemical denervation.
Expiration Date (Month Year): 11/2023

## 2021-09-23 ENCOUNTER — APPOINTMENT (RX ONLY)
Dept: URBAN - METROPOLITAN AREA CLINIC 156 | Facility: CLINIC | Age: 66
Setting detail: DERMATOLOGY
End: 2021-09-23

## 2021-09-23 DIAGNOSIS — Z41.9 ENCOUNTER FOR PROCEDURE FOR PURPOSES OTHER THAN REMEDYING HEALTH STATE, UNSPECIFIED: ICD-10-CM

## 2021-09-23 PROCEDURE — ? FILLERS

## 2021-09-23 NOTE — PROCEDURE: FILLERS
Lot #: [id number]
Decollete Filler  Volume In Cc: 0
Use Map Statement For Sites (Optional): No
Marionette Lines Filler  Volume In Cc: 1
Map Statment: See Attach Map for Complete Details
Expiration Date (Month Year): 2022-04-24
Anesthesia Volume In Cc: 0.5
Additional Anesthesia Volume In Cc: 6
Consent: Written consent obtained. Risks include but not limited to bruising, beading, irregular texture, ulceration, infection, allergic reaction, scar formation, incomplete augmentation, temporary nature, procedural pain.
Topical Anesthesia?: 23% lidocaine, 7% tetracaine
Post-Care Instructions: Patient instructed to apply ice to reduce swelling.
Filler: Juvederm Ultra Plus XC
Detail Level: Detailed

## 2021-12-09 ENCOUNTER — APPOINTMENT (RX ONLY)
Dept: URBAN - METROPOLITAN AREA CLINIC 156 | Facility: CLINIC | Age: 66
Setting detail: DERMATOLOGY
End: 2021-12-09

## 2021-12-09 DIAGNOSIS — Z41.9 ENCOUNTER FOR PROCEDURE FOR PURPOSES OTHER THAN REMEDYING HEALTH STATE, UNSPECIFIED: ICD-10-CM

## 2021-12-09 PROCEDURE — ? FILLERS

## 2021-12-09 NOTE — PROCEDURE: FILLERS
Lot #: [id number]
Decollete Filler  Volume In Cc: 0
Use Map Statement For Sites (Optional): No
Marionette Lines Filler  Volume In Cc: 1
Map Statment: See Attach Map for Complete Details
Expiration Date (Month Year): 2022-04-24
Anesthesia Volume In Cc: 0.5
Additional Anesthesia Volume In Cc: 6
Consent: Written consent obtained. Risks include but not limited to bruising, beading, irregular texture, ulceration, infection, allergic reaction, scar formation, incomplete augmentation, temporary nature, procedural pain.
Topical Anesthesia?: 23% lidocaine, 7% tetracaine
Post-Care Instructions: Patient instructed to apply ice to reduce swelling.
Filler: Juvederm Ultra Plus XC
Detail Level: Detailed

## 2022-01-18 ENCOUNTER — APPOINTMENT (RX ONLY)
Dept: URBAN - METROPOLITAN AREA CLINIC 156 | Facility: CLINIC | Age: 67
Setting detail: DERMATOLOGY
End: 2022-01-18

## 2022-01-18 DIAGNOSIS — Z41.9 ENCOUNTER FOR PROCEDURE FOR PURPOSES OTHER THAN REMEDYING HEALTH STATE, UNSPECIFIED: ICD-10-CM

## 2022-01-18 PROCEDURE — ? FILLERS

## 2022-01-18 PROCEDURE — ? BOTOX

## 2022-01-18 NOTE — PROCEDURE: BOTOX
Show Topical Anesthesia: Yes
Left Pupillary Line Units: 0
Lot #: [id number]
Show Mentalis Units: No
Expiration Date (Month Year): 03/2024
Detail Level: Detailed
Dilution (U/0.1 Cc): 2.5
Consent: Written consent obtained. Risks include but not limited to lid/brow ptosis, bruising, swelling, diplopia, temporary effect, incomplete chemical denervation.
Reconstitution Date (Optional): 01/02/2022
Post-Care Instructions: Patient instructed to not lie down for 4 hours and limit physical activity for 24 hours. Patient instructed not to travel by airplane for 48 hours.

## 2022-01-18 NOTE — PROCEDURE: FILLERS
Cheeks Filler Volume In Cc: 0
Use Map Statement For Sites (Optional): No
Map Statment: See Attach Map for Complete Details
Anesthesia Volume In Cc: 0.5
Additional Anesthesia Volume In Cc: 6
Topical Anesthesia?: BLT cream (benzocaine 10%, lidocaine 4%, tetracaine 2%)
Post-Care Instructions: Patient instructed to apply ice to reduce swelling.
Consent: Written consent obtained. Risks include but not limited to bruising, beading, irregular texture, ulceration, infection, allergic reaction, scar formation, incomplete augmentation, temporary nature, procedural pain.
Filler: Juvederm Ultra Plus XC
Detail Level: Detailed

## 2022-08-30 ENCOUNTER — APPOINTMENT (RX ONLY)
Dept: URBAN - METROPOLITAN AREA CLINIC 156 | Facility: CLINIC | Age: 67
Setting detail: DERMATOLOGY
End: 2022-08-30

## 2022-08-30 DIAGNOSIS — Z41.9 ENCOUNTER FOR PROCEDURE FOR PURPOSES OTHER THAN REMEDYING HEALTH STATE, UNSPECIFIED: ICD-10-CM

## 2022-08-30 PROCEDURE — ? JUVEDERM ULTRA PLUS XC INJECTION

## 2022-08-30 PROCEDURE — ? DYSPORT

## 2022-08-30 NOTE — PROCEDURE: JUVEDERM ULTRA PLUS XC INJECTION
Lot #: [id number]
Post-Care Instructions: Patient instructed to apply ice to reduce swelling.
Topical Anesthesia?: 23% lidocaine, 7% tetracaine
Include Cannula Information In Note?: No
Anesthesia Volume In Cc: 0
Detail Level: Detailed
Map Statment: See Attach Map for Complete Details
Expiration Date (Month Year): 01-
Filler: Juvederm Ultra Plus XC
Procedural Text: The filler was administered to the treatment areas noted above.
Consent: Written consent obtained. Risks include but not limited to bruising, beading, irregular texture, ulceration, infection, allergic reaction, scar formation, incomplete augmentation, temporary nature, procedural pain.
Marionette Lines Filler  Volume In Cc: 1

## 2022-08-30 NOTE — PROCEDURE: DYSPORT
Lcl Root Units: 0
Show Additional Area 6: Yes
Show Ucl Units: No
Consent: Written consent obtained. Risks include but not limited to lid/brow ptosis, bruising, swelling, diplopia, temporary effect, incomplete chemical denervation.
Post-Care Instructions: Patient instructed to not lie down for 4 hours and limit physical activity for 24 hours.
Lot #: [id number]
Dilution (U/ 0.1cc): 7.5
Reconstitution Date (Optional): 8/29
Expiration Date (Month Year): 02/28/2023
Detail Level: Detailed

## 2022-09-27 ENCOUNTER — APPOINTMENT (RX ONLY)
Dept: URBAN - METROPOLITAN AREA CLINIC 156 | Facility: CLINIC | Age: 67
Setting detail: DERMATOLOGY
End: 2022-09-27

## 2022-09-27 DIAGNOSIS — Z41.9 ENCOUNTER FOR PROCEDURE FOR PURPOSES OTHER THAN REMEDYING HEALTH STATE, UNSPECIFIED: ICD-10-CM

## 2022-09-27 PROCEDURE — ? JUVEDERM ULTRA PLUS XC INJECTION

## 2022-09-27 NOTE — PROCEDURE: JUVEDERM ULTRA PLUS XC INJECTION
Lot #: [id number]
Post-Care Instructions: Patient instructed to apply ice to reduce swelling.
Topical Anesthesia?: 23% lidocaine, 7% tetracaine
Include Cannula Information In Note?: No
Anesthesia Volume In Cc: 0
Detail Level: Detailed
Map Statment: See Attach Map for Complete Details
Expiration Date (Month Year): 01-
Filler: Juvederm Ultra Plus XC
Procedural Text: The filler was administered to the treatment areas noted above.
Consent: Written consent obtained. Risks include but not limited to bruising, beading, irregular texture, ulceration, infection, allergic reaction, scar formation, incomplete augmentation, temporary nature, procedural pain.
Marionette Lines Filler  Volume In Cc: 1

## 2023-04-06 ENCOUNTER — APPOINTMENT (RX ONLY)
Dept: URBAN - METROPOLITAN AREA CLINIC 156 | Facility: CLINIC | Age: 68
Setting detail: DERMATOLOGY
End: 2023-04-06

## 2023-04-06 DIAGNOSIS — Z41.9 ENCOUNTER FOR PROCEDURE FOR PURPOSES OTHER THAN REMEDYING HEALTH STATE, UNSPECIFIED: ICD-10-CM

## 2023-04-06 PROCEDURE — ? DYSPORT

## 2023-04-06 PROCEDURE — ? RESTYLANE DEFYNE INJECTION

## 2023-04-06 NOTE — PROCEDURE: DYSPORT
Depressor Anguli Oris Units: 0
Consent: Written consent obtained. Risks include but not limited to lid/brow ptosis, bruising, swelling, diplopia, temporary effect, incomplete chemical denervation.
Dilution (U/ 0.1cc): 7.5
Show Forehead Units: Yes
Show Right And Left Periorbital Units: No
Post-Care Instructions: Patient instructed to not lie down for 4 hours and limit physical activity for 24 hours.
Detail Level: Detailed
Additional Comments: see diagram

## 2023-04-06 NOTE — PROCEDURE: RESTYLANE DEFYNE INJECTION
Detail Level: Detailed
Use Map Statement For Sites (Optional): No
Map Statement: See Attached Map for Complete Details
Anesthesia Volume In Cc: 0.5
Additional Anesthesia Volume In Cc: 6
Filler: Restylane Defyne
Topical Anesthesia?: 7% tetracaine, 23% lidocaine
Tear Troughs Filler  Volume In Cc: 0
Nasolabial Folds Filler Volume In Cc: 0.3
Marionette Lines Filler  Volume In Cc: 0.7
Number Of Syringes (Required For Inventory): 1
Procedural Text: The filler was administered to the treatment areas noted above.
Consent: Written consent obtained. Risks include but not limited to bruising, beading, irregular texture, ulceration, infection, allergic reaction, scar formation, incomplete augmentation, temporary nature, procedural pain.
Post-Care Instructions: Patient instructed to apply ice to reduce swelling.

## 2023-05-11 ENCOUNTER — APPOINTMENT (RX ONLY)
Dept: URBAN - METROPOLITAN AREA CLINIC 156 | Facility: CLINIC | Age: 68
Setting detail: DERMATOLOGY
End: 2023-05-11

## 2023-05-11 DIAGNOSIS — Z41.9 ENCOUNTER FOR PROCEDURE FOR PURPOSES OTHER THAN REMEDYING HEALTH STATE, UNSPECIFIED: ICD-10-CM

## 2023-05-11 PROCEDURE — ? RESTYLANE DEFYNE INJECTION

## 2023-05-11 NOTE — PROCEDURE: RESTYLANE DEFYNE INJECTION
Detail Level: Detailed
Use Map Statement For Sites (Optional): No
Map Statement: See Attached Map for Complete Details
Anesthesia Volume In Cc: 0.5
Additional Anesthesia Volume In Cc: 6
Filler: Restylane Defyne
Topical Anesthesia?: 7% tetracaine, 23% lidocaine
Tear Troughs Filler  Volume In Cc: 0
Nasolabial Folds Filler Volume In Cc: 0.3
Marionette Lines Filler  Volume In Cc: 0.7
Number Of Syringes (Required For Inventory): 1
Lot #: [id number]
Expiration Date (Month Year): 2024-01-31
Procedural Text: The filler was administered to the treatment areas noted above.
Consent: Written consent obtained. Risks include but not limited to bruising, beading, irregular texture, ulceration, infection, allergic reaction, scar formation, incomplete augmentation, temporary nature, procedural pain.
Post-Care Instructions: Patient instructed to apply ice to reduce swelling.

## 2023-05-17 ENCOUNTER — APPOINTMENT (RX ONLY)
Dept: URBAN - METROPOLITAN AREA CLINIC 154 | Facility: CLINIC | Age: 68
Setting detail: DERMATOLOGY
End: 2023-05-17

## 2023-05-17 DIAGNOSIS — Z41.9 ENCOUNTER FOR PROCEDURE FOR PURPOSES OTHER THAN REMEDYING HEALTH STATE, UNSPECIFIED: ICD-10-CM

## 2023-05-17 PROCEDURE — ? SIGNATURE HYDRAFACIAL

## 2023-05-17 NOTE — PROCEDURE: SIGNATURE HYDRAFACIAL
Number Of Passes: 0
Tip: Hydropeel Tip, Blue
Tip: Hydropeel Tip, Teal
Solution Override
Solution: GlySal 7.5%
Location: face
Procedure: Exfoliation
Procedure: Extraction
Tip: Hydropeel Tip, Clear
Procedure: Fusion
Solution: Activ-4
Solution: Beta-HD
Procedure: Boost
Treatment Number: 1
Post-Care Instructions: I reviewed with the patient in detail post-care instructions. Patient should stay away from the sun and wear sun protection until treated areas are fully healed.
Procedure: Extend and Protect
Procedure: Peel
Indication: skin texture
Consent: Written consent obtained, risks reviewed including but not limited to crusting, scabbing, blistering, scarring, darker or lighter pigmentary change, bruising, and/or incomplete response.
Tip Override

## 2023-07-07 ENCOUNTER — APPOINTMENT (RX ONLY)
Dept: URBAN - METROPOLITAN AREA CLINIC 154 | Facility: CLINIC | Age: 68
Setting detail: DERMATOLOGY
End: 2023-07-07

## 2023-07-07 DIAGNOSIS — Z41.9 ENCOUNTER FOR PROCEDURE FOR PURPOSES OTHER THAN REMEDYING HEALTH STATE, UNSPECIFIED: ICD-10-CM

## 2023-07-07 PROCEDURE — ? SIGNATURE HYDRAFACIAL

## 2023-07-07 NOTE — PROCEDURE: SIGNATURE HYDRAFACIAL
Procedure: Extraction
Vacuum Pressure High Setting (Will Not Render If Set To 0): 0
Tip: Hydropeel Tip, Clear
Solution Override
Tip: Hydropeel Tip, Blue
Indication: skin texture
Tip: Hydropeel Tip, Teal
Procedure: Boost
Procedure: Exfoliation
Solution: Beta-HD
Location: face
Procedure: Fusion
Consent: Written consent obtained, risks reviewed including but not limited to crusting, scabbing, blistering, scarring, darker or lighter pigmentary change, bruising, and/or incomplete response.
Procedure: Peel
Tip Override
Solution: Activ-4
Post-Care Instructions: I reviewed with the patient in detail post-care instructions. Patient should stay away from the sun and wear sun protection until treated areas are fully healed.
Solution: GlySal 7.5%
Procedure: Extend and Protect
Treatment Number: 1

## 2023-07-27 ENCOUNTER — APPOINTMENT (RX ONLY)
Dept: URBAN - METROPOLITAN AREA CLINIC 156 | Facility: CLINIC | Age: 68
Setting detail: DERMATOLOGY
End: 2023-07-27

## 2023-07-27 DIAGNOSIS — Z41.9 ENCOUNTER FOR PROCEDURE FOR PURPOSES OTHER THAN REMEDYING HEALTH STATE, UNSPECIFIED: ICD-10-CM

## 2023-07-27 PROCEDURE — ? DYSPORT

## 2023-07-27 PROCEDURE — ? RESTYLANE DEFYNE INJECTION

## 2023-07-27 NOTE — PROCEDURE: RESTYLANE DEFYNE INJECTION
Temple Hollows Filler  Volume In Cc: 0
Anesthesia Volume In Cc: 0.5
Use Map Statement For Sites (Optional): No
Number Of Syringes (Required For Inventory): 1
Marionette Lines Filler  Volume In Cc: 0.8
Topical Anesthesia?: 23% lidocaine, 7% tetracaine
Procedural Text: The filler was administered to the treatment areas noted above.
Filler: Restylane Defyne
Consent: Written consent obtained. Risks include but not limited to bruising, beading, irregular texture, ulceration, infection, allergic reaction, scar formation, incomplete augmentation, temporary nature, procedural pain.
Nasolabial Folds Filler Volume In Cc: 0.2
Map Statement: See Attached Map for Complete Details
Additional Anesthesia Volume In Cc: 6
Post-Care Instructions: Patient instructed to apply ice to reduce swelling.
Detail Level: Detailed

## 2023-07-27 NOTE — PROCEDURE: DYSPORT
Lot #: [id number]
Show Nasal Units: Yes
Levator Labii Superioris Units: 0
Show Right And Left Brow Units: No
Dilution (U/ 0.1cc): 7.5
Consent: Written consent obtained. Risks include but not limited to lid/brow ptosis, bruising, swelling, diplopia, temporary effect, incomplete chemical denervation.
Expiration Date (Month Year): 8/23
Post-Care Instructions: Patient instructed to not lie down for 4 hours and limit physical activity for 24 hours.
Detail Level: Detailed

## 2023-08-04 ENCOUNTER — APPOINTMENT (RX ONLY)
Dept: URBAN - METROPOLITAN AREA CLINIC 154 | Facility: CLINIC | Age: 68
Setting detail: DERMATOLOGY
End: 2023-08-04

## 2023-08-04 DIAGNOSIS — Z41.9 ENCOUNTER FOR PROCEDURE FOR PURPOSES OTHER THAN REMEDYING HEALTH STATE, UNSPECIFIED: ICD-10-CM

## 2023-08-04 PROCEDURE — ? SIGNATURE HYDRAFACIAL

## 2023-08-04 NOTE — PROCEDURE: SIGNATURE HYDRAFACIAL
Treatment Number: 1
Vacuum Pressure High Setting (Will Not Render If Set To 0): 0
Consent: Written consent obtained, risks reviewed including but not limited to crusting, scabbing, blistering, scarring, darker or lighter pigmentary change, bruising, and/or incomplete response.
Tip: Hydropeel Tip, Blue
Post-Care Instructions: I reviewed with the patient in detail post-care instructions. Patient should stay away from the sun and wear sun protection until treated areas are fully healed.
Comments: Kayden
Tip: Hydropeel Tip, Teal
Tip: Hydropeel Tip, Clear
Procedure: Exfoliation
Indication: skin texture
Tip Override
Procedure: Peel
Procedure: Boost
Procedure: Extraction
Location: face
Procedure: Extend and Protect
Solution: Activ-4
Procedure: Fusion
Solution: GlySal 7.5%
Solution: Beta-HD
Solution Override

## 2023-10-27 ENCOUNTER — APPOINTMENT (RX ONLY)
Dept: URBAN - METROPOLITAN AREA CLINIC 154 | Facility: CLINIC | Age: 68
Setting detail: DERMATOLOGY
End: 2023-10-27

## 2023-10-27 DIAGNOSIS — Z41.9 ENCOUNTER FOR PROCEDURE FOR PURPOSES OTHER THAN REMEDYING HEALTH STATE, UNSPECIFIED: ICD-10-CM

## 2023-10-27 PROCEDURE — ? SIGNATURE HYDRAFACIAL

## 2023-10-27 NOTE — PROCEDURE: SIGNATURE HYDRAFACIAL
Solution Override
Number Of Passes: 0
Tip: Hydropeel Tip, Orange Aggression
Tip: Hydropeel Tip, Teal
Indication: skin texture
Solution Override: retinol
Procedure: Exfoliation
Tip: Hydropeel Tip, Clear
Procedure: Peel
Vacuum Pressure Low Setting (Will Not Render If Set To 0): 16
Tip Override
Solution: Activ-4
Consent: Written consent obtained, risks reviewed including but not limited to crusting, scabbing, blistering, scarring, darker or lighter pigmentary change, bruising, and/or incomplete response.
Location: face
Procedure: Extraction
Procedure: Boost
Post-Care Instructions: I reviewed with the patient in detail post-care instructions. Patient should stay away from the sun and wear sun protection until treated areas are fully healed.
Solution: GlySal 15%
Solution: Beta-HD
Procedure: Fusion
Treatment Number: 1
Procedure: Extend and Protect

## 2024-01-19 ENCOUNTER — APPOINTMENT (RX ONLY)
Dept: URBAN - METROPOLITAN AREA CLINIC 154 | Facility: CLINIC | Age: 69
Setting detail: DERMATOLOGY
End: 2024-01-19

## 2024-01-19 DIAGNOSIS — Z41.9 ENCOUNTER FOR PROCEDURE FOR PURPOSES OTHER THAN REMEDYING HEALTH STATE, UNSPECIFIED: ICD-10-CM

## 2024-01-19 PROCEDURE — ? SIGNATURE HYDRAFACIAL

## 2024-01-19 NOTE — PROCEDURE: SIGNATURE HYDRAFACIAL
Tip Override
Vacuum Pressure High Setting (Will Not Render If Set To 0): 0
Consent: Written consent obtained, risks reviewed including but not limited to crusting, scabbing, blistering, scarring, darker or lighter pigmentary change, bruising, and/or incomplete response.
Indication: skin texture
Procedure: Extend and Protect
Treatment Number: 1
Post-Care Instructions: I reviewed with the patient in detail post-care instructions. Patient should stay away from the sun and wear sun protection until treated areas are fully healed.
Vacuum Pressure Low Setting (Will Not Render If Set To 0): 16
Solution Override
Procedure: Peel
Comments: DERMA plane
Tip: Hydropeel Tip, Teal
Tip: Hydropeel Tip, Orange Aggression
Procedure: Extraction
Procedure: Fusion
Procedure: Exfoliation
Tip: Hydropeel Tip, Clear
Location: face
Solution: GlySal 15%
Solution Override: retinol
Solution: Activ-4
Solution: Beta-HD
Procedure: Boost

## 2024-02-15 ENCOUNTER — APPOINTMENT (RX ONLY)
Dept: URBAN - METROPOLITAN AREA CLINIC 156 | Facility: CLINIC | Age: 69
Setting detail: DERMATOLOGY
End: 2024-02-15

## 2024-02-15 DIAGNOSIS — Z41.9 ENCOUNTER FOR PROCEDURE FOR PURPOSES OTHER THAN REMEDYING HEALTH STATE, UNSPECIFIED: ICD-10-CM

## 2024-02-15 PROCEDURE — ? RESTYLANE DEFYNE INJECTION

## 2024-02-15 PROCEDURE — ? DYSPORT

## 2024-02-15 NOTE — PROCEDURE: DYSPORT
Consent: Written consent obtained. Risks include but not limited to lid/brow ptosis, bruising, swelling, diplopia, temporary effect, incomplete chemical denervation.
Show Forehead Units: Yes
Forehead Units: 0
Dilution (U/0.1 Cc): 7.5
Show Right And Left Pupillary Line Units: No
Detail Level: Detailed
Post-Care Instructions: Patient instructed to not lie down for 4 hours and limit physical activity for 24 hours.

## 2024-02-15 NOTE — PROCEDURE: RESTYLANE DEFYNE INJECTION
Vermilion Lips Filler Volume In Cc: 0
Use Map Statement For Sites (Optional): No
Additional Anesthesia Volume In Cc: 6
Lot #: [id number]
Anesthesia Volume In Cc: 0.5
Consent: Written consent obtained. Risks include but not limited to bruising, beading, irregular texture, ulceration, infection, allergic reaction, scar formation, incomplete augmentation, temporary nature, procedural pain.
Marionette Lines Filler Volume In Cc: 0.9
Procedural Text: The filler was administered to the treatment areas noted above.
Detail Level: Detailed
Topical Anesthesia?: 23% lidocaine, 7% tetracaine
Number Of Syringes (Required For Inventory): 1
Post-Care Instructions: Patient instructed to apply ice to reduce swelling.
Map Statement: See Attached Map for Complete Details
Expiration Date (Month Year): 2024-12-31
Nasolabial Folds Filler Volume In Cc: 0.1
Filler: Restylane Defyne

## 2024-02-23 ENCOUNTER — APPOINTMENT (RX ONLY)
Dept: URBAN - METROPOLITAN AREA CLINIC 154 | Facility: CLINIC | Age: 69
Setting detail: DERMATOLOGY
End: 2024-02-23

## 2024-02-23 DIAGNOSIS — Z41.9 ENCOUNTER FOR PROCEDURE FOR PURPOSES OTHER THAN REMEDYING HEALTH STATE, UNSPECIFIED: ICD-10-CM

## 2024-02-23 PROCEDURE — ? SIGNATURE HYDRAFACIAL

## 2024-02-23 NOTE — PROCEDURE: SIGNATURE HYDRAFACIAL
Solution Override: retinol
Procedure: Exfoliation
Vacuum Pressure High Setting (Will Not Render If Set To 0): 0
Tip Override
Post-Care Instructions: I reviewed with the patient in detail post-care instructions. Patient should stay away from the sun and wear sun protection until treated areas are fully healed.
Indication: skin texture
Tip: Hydropeel Tip, Orange Aggression
Consent: Written consent obtained, risks reviewed including but not limited to crusting, scabbing, blistering, scarring, darker or lighter pigmentary change, bruising, and/or incomplete response.
Procedure: Fusion
Comments: DERMA plane
Procedure: Extraction
Treatment Number: 1
Solution: GlySal 15%
Tip: Hydropeel Tip, Clear
Tip: Hydropeel Tip, Teal
Solution Override
Vacuum Pressure Low Setting (Will Not Render If Set To 0): 16
Procedure: Peel
Solution: Activ-4
Location: face
Procedure: Extend and Protect
Procedure: Boost
Solution: Beta-HD
no

## 2024-03-29 ENCOUNTER — APPOINTMENT (RX ONLY)
Dept: URBAN - METROPOLITAN AREA CLINIC 154 | Facility: CLINIC | Age: 69
Setting detail: DERMATOLOGY
End: 2024-03-29

## 2024-03-29 DIAGNOSIS — Z41.9 ENCOUNTER FOR PROCEDURE FOR PURPOSES OTHER THAN REMEDYING HEALTH STATE, UNSPECIFIED: ICD-10-CM

## 2024-03-29 PROCEDURE — ? SIGNATURE HYDRAFACIAL

## 2024-03-29 PROCEDURE — ? LASER HAIR REMOVAL

## 2024-03-29 NOTE — PROCEDURE: SIGNATURE HYDRAFACIAL
Solution Override
Vacuum Pressure High Setting (Will Not Render If Set To 0): 0
Procedure: Peel
Consent: Written consent obtained, risks reviewed including but not limited to crusting, scabbing, blistering, scarring, darker or lighter pigmentary change, bruising, and/or incomplete response.
Procedure: Exfoliation
Procedure: Extend and Protect
Tip: Hydropeel Tip, Teal
Post-Care Instructions: I reviewed with the patient in detail post-care instructions. Patient should stay away from the sun and wear sun protection until treated areas are fully healed.
Location: face
Solution: GlySal 15%
Solution Override: retinol
Procedure: Extraction
Solution: Activ-4
Tip: Hydropeel Tip, Clear
Vacuum Pressure Low Setting (Will Not Render If Set To 0): 16
Solution: Beta-HD
Procedure: Boost
Procedure: Fusion
Treatment Number: 1
Comments: Kayden
Indication: skin texture
Tip Override
Tip: Hydropeel Tip, Orange Aggression

## 2024-03-29 NOTE — PROCEDURE: LASER HAIR REMOVAL
Number Of Prepaid Treatments (Will Not Render If 0): 0
Pulse Duration (Include Units): 10
Device Serial Number (Optional): [id number]
Fluence (Will Not Render If 0): 12
Laser Type: Alexandrite 755nm
Spot Size: 1.5 mm
Fluence (Will Not Render If 0): 20
Post-Care Instructions: I reviewed with the patient in detail post-care instructions. Patient should avoid sun for a minimum of 4 weeks before and after treatment.
Location Override: arms
Total Pulses: 546
Spot Size: 18 mm
Cooling: DCD setting
Topical Anesthesia Type: 10% benzocaine, 3% lidocaine, 2% tetracaine, 0.01% phenylephrine
Eye Shield Text: Given the treatment area eye shields were inserted prior to treatment.
Render Post-Care In The Note: No
Pre-Procedure: Prior to proceeding the treatment areas were cleaned and all present put on their eye protection.
Cooling: DCD 30/20
Total Pulses: 67
Tolerated Procedure (Optional): Tolerated Well
Post-Procedure Care: Immediate endpoint: perifollicular erythema and edema. Laser Gel or Extreme Protect were applied post laser. Post care reviewed with patient.
Cooling: DCD 40/30
Consent: Written consent obtained, risks reviewed including but not limited to crusting, scabbing, blistering, scarring, darker or lighter pigmentary change, paradoxical hair regrowth, incomplete removal of hair and infection.
Detail Level: Generalized

## 2024-06-06 ENCOUNTER — APPOINTMENT (RX ONLY)
Dept: URBAN - METROPOLITAN AREA CLINIC 156 | Facility: CLINIC | Age: 69
Setting detail: DERMATOLOGY
End: 2024-06-06

## 2024-06-06 DIAGNOSIS — Z41.9 ENCOUNTER FOR PROCEDURE FOR PURPOSES OTHER THAN REMEDYING HEALTH STATE, UNSPECIFIED: ICD-10-CM

## 2024-06-06 PROCEDURE — ? DYSPORT

## 2024-06-06 PROCEDURE — ? FILLERS

## 2024-06-06 NOTE — PROCEDURE: FILLERS
Aspiration Statement: Aspiration was performed prior to injecting site with filler.
Map Statment: See Attach Map for Complete Details
Use Map Statement For Sites (Optional): No
Detail Level: Detailed
Brows Filler Volume In Cc: 0
Marionette Lines Filler Volume In Cc: 0.7
Consent: Written consent obtained. Risks include but not limited to bruising, beading, irregular texture, ulceration, infection, allergic reaction, scar formation, incomplete augmentation, temporary nature, procedural pain.
Anesthesia Volume In Cc: 0.5
Post-Care Instructions: Patient instructed to apply ice to reduce swelling.
Additional Anesthesia Volume In Cc: 6
Filler: Restylane Defyne
Nasolabial Folds Filler Volume In Cc: 0.3

## 2024-06-06 NOTE — PROCEDURE: DYSPORT
Forehead Units: 0
Show Additional Area 6: Yes
Show Right And Left Brow Units: No
Post-Care Instructions: Patient instructed to not lie down for 4 hours and limit physical activity for 24 hours.
Detail Level: Detailed
Consent: Written consent obtained. Risks include but not limited to lid/brow ptosis, bruising, swelling, diplopia, temporary effect, incomplete chemical denervation.
Dilution (U/0.1 Cc): 7.5

## 2024-06-14 ENCOUNTER — APPOINTMENT (RX ONLY)
Dept: URBAN - METROPOLITAN AREA CLINIC 154 | Facility: CLINIC | Age: 69
Setting detail: DERMATOLOGY
End: 2024-06-14

## 2024-06-14 DIAGNOSIS — Z41.9 ENCOUNTER FOR PROCEDURE FOR PURPOSES OTHER THAN REMEDYING HEALTH STATE, UNSPECIFIED: ICD-10-CM

## 2024-06-14 PROCEDURE — ? SIGNATURE HYDRAFACIAL

## 2024-06-14 NOTE — PROCEDURE: SIGNATURE HYDRAFACIAL
Vacuum Pressure Low Setting (Will Not Render If Set To 0): 0
Procedure: Extend and Protect
Procedure: Boost
Tip: Hydropeel Tip, Teal
Solution: Activ-4
Procedure: Peel
Procedure: Extraction
Vacuum Pressure Low Setting (Will Not Render If Set To 0): 16
Solution Override
Consent: Written consent obtained, risks reviewed including but not limited to crusting, scabbing, blistering, scarring, darker or lighter pigmentary change, bruising, and/or incomplete response.
Treatment Number: 1
Tip: Hydropeel Tip, Clear
Procedure: Fusion
Indication: skin texture
Post-Care Instructions: I reviewed with the patient in detail post-care instructions. Patient should stay away from the sun and wear sun protection until treated areas are fully healed.
Comments: Kayden
Solution: Beta-HD
Solution: GlySal 15%
Solution Override: retinol
Tip: Hydropeel Tip, Orange Aggression
Procedure: Exfoliation
Tip Override
Location: face

## 2024-06-28 ENCOUNTER — APPOINTMENT (RX ONLY)
Dept: URBAN - METROPOLITAN AREA CLINIC 154 | Facility: CLINIC | Age: 69
Setting detail: DERMATOLOGY
End: 2024-06-28

## 2024-06-28 DIAGNOSIS — Z41.9 ENCOUNTER FOR PROCEDURE FOR PURPOSES OTHER THAN REMEDYING HEALTH STATE, UNSPECIFIED: ICD-10-CM

## 2024-06-28 PROCEDURE — ? LASER HAIR REMOVAL

## 2024-06-28 NOTE — PROCEDURE: LASER HAIR REMOVAL
Cooling: DCD setting
Pre-Procedure: Prior to proceeding the treatment areas were cleaned and all present put on their eye protection.
Treatment Number: 0
Detail Level: Generalized
Fluence (Will Not Render If 0): 20
Pulse Duration (Include Units): 3
Post-Care Instructions: I reviewed with the patient in detail post-care instructions. Patient should avoid sun for a minimum of 4 weeks before and after treatment.
Total Pulses: 67
Topical Anesthesia Type: 10% benzocaine, 3% lidocaine, 2% tetracaine, 0.01% phenylephrine
Were Eye Shields Employed?: No
Spot Size: 18 mm
Post-Procedure Care: Immediate endpoint: perifollicular erythema and edema. Laser Gel or Extreme Protect were applied post laser. Post care reviewed with patient.
Tolerated Procedure (Optional): Tolerated Well
Total Pulses: 169
Fluence (Will Not Render If 0): 12
Device Serial Number (Optional): [id number]
Laser Type: Alexandrite 755nm
Cooling: DCD 40/30
Spot Size: 1.5 mm
Eye Shield Text: Given the treatment area eye shields were inserted prior to treatment.
Cooling: DCD 30/20
Consent: Written consent obtained, risks reviewed including but not limited to crusting, scabbing, blistering, scarring, darker or lighter pigmentary change, paradoxical hair regrowth, incomplete removal of hair and infection.

## 2024-08-23 ENCOUNTER — APPOINTMENT (RX ONLY)
Dept: URBAN - METROPOLITAN AREA CLINIC 154 | Facility: CLINIC | Age: 69
Setting detail: DERMATOLOGY
End: 2024-08-23

## 2024-08-23 DIAGNOSIS — Z41.9 ENCOUNTER FOR PROCEDURE FOR PURPOSES OTHER THAN REMEDYING HEALTH STATE, UNSPECIFIED: ICD-10-CM

## 2024-08-23 PROCEDURE — ? SIGNATURE HYDRAFACIAL

## 2024-08-23 NOTE — PROCEDURE: SIGNATURE HYDRAFACIAL
Solution Override
Vacuum Pressure High Setting (Will Not Render If Set To 0): 0
Procedure: Fusion
Tip: Hydropeel Tip, Teal
Solution: Activ-4
Tip Override
Procedure: Extraction
Vacuum Pressure Low Setting (Will Not Render If Set To 0): 16
Solution Override: retinol
Procedure: Peel
Procedure: Extend and Protect
Treatment Number: 1
Indication: skin texture
Tip: Hydropeel Tip, Clear
Comments: Kayden
Solution: Beta-HD
Consent: Written consent obtained, risks reviewed including but not limited to crusting, scabbing, blistering, scarring, darker or lighter pigmentary change, bruising, and/or incomplete response.
Solution: GlySal 15%
Procedure: Boost
Tip: Hydropeel Tip, Orange Aggression
Post-Care Instructions: I reviewed with the patient in detail post-care instructions. Patient should stay away from the sun and wear sun protection until treated areas are fully healed.
Procedure: Exfoliation
Location: face

## 2024-10-03 ENCOUNTER — APPOINTMENT (RX ONLY)
Dept: URBAN - METROPOLITAN AREA CLINIC 154 | Facility: CLINIC | Age: 69
Setting detail: DERMATOLOGY
End: 2024-10-03

## 2024-10-03 DIAGNOSIS — Z41.9 ENCOUNTER FOR PROCEDURE FOR PURPOSES OTHER THAN REMEDYING HEALTH STATE, UNSPECIFIED: ICD-10-CM

## 2024-10-03 PROCEDURE — ? DERMAPLANE

## 2024-10-03 PROCEDURE — ? LASER HAIR REMOVAL

## 2024-10-03 PROCEDURE — ? SIGNATURE HYDRAFACIAL

## 2024-10-03 ASSESSMENT — LOCATION SIMPLE DESCRIPTION DERM: LOCATION SIMPLE: RIGHT CHEEK

## 2024-10-03 ASSESSMENT — LOCATION DETAILED DESCRIPTION DERM: LOCATION DETAILED: RIGHT CENTRAL MALAR CHEEK

## 2024-10-03 ASSESSMENT — LOCATION ZONE DERM: LOCATION ZONE: FACE

## 2024-10-03 NOTE — PROCEDURE: LASER HAIR REMOVAL
Pre-Procedure: Prior to proceeding the treatment areas were cleaned and all present put on their eye protection.
Total Pulses: 46
Tolerated Procedure (Optional): Tolerated Well
Number Of Prepaid Treatments (Will Not Render If 0): 0
Fluence (Will Not Render If 0): 2E+1
Fluence (Will Not Render If 0): 12
Were Eye Shields Employed?: No
Pulse Duration (Include Units): 10
Post-Procedure Care: Immediate endpoint: perifollicular erythema and edema. Laser Gel or Extreme Protect were applied post laser. Post care reviewed with patient.
Total Pulses: 16
Eye Shield Text: Given the treatment area eye shields were inserted prior to treatment.
Spot Size: 18 mm
Topical Anesthesia Type: 10% benzocaine, 3% lidocaine, 2% tetracaine, 0.01% phenylephrine
Consent: Written consent obtained, risks reviewed including but not limited to crusting, scabbing, blistering, scarring, darker or lighter pigmentary change, paradoxical hair regrowth, incomplete removal of hair and infection.
Cooling: DCD 30/20
Device Serial Number (Optional): [id number]
Cooling: DCD setting
Laser Type: Nd:Yag 1064nm
Detail Level: Generalized
Spot Size: 1.5 mm
Cooling: DCD 40/30
Post-Care Instructions: I reviewed with the patient in detail post-care instructions. Patient should avoid sun for a minimum of 4 weeks before and after treatment.

## 2024-10-03 NOTE — PROCEDURE: DERMAPLANE
Post-Procedure Instructions: Following the dermaplane procedure, Oxymist treatment was applied to the treatment areas. Moisturizer and SPF was applied.
Treatment Area Prep: alcohol
Blade: 10 blade scalpel
Pre-Procedure Text: The patient was placed in a recumbant position on the procedure table. Hydracool applied post procedure
Detail Level: Generalized
Post-Care Instructions: I reviewed with the patient in detail post-care instructions.
Treatment Areas: face and neck

## 2024-10-03 NOTE — PROCEDURE: SIGNATURE HYDRAFACIAL
Solution: GlySal 15%
Indication: skin texture
Number Of Passes: 0
Post-Care Instructions: I reviewed with the patient in detail post-care instructions. Patient should stay away from the sun and wear sun protection until treated areas are fully healed.
Solution Override
Procedure: Boost
Procedure: Fusion
Tip: Hydropeel Tip, Teal
Tip: Hydropeel Tip, Orange Aggression
Procedure: Exfoliation
Location: face
Solution Override: retinol
Tip: Hydropeel Tip, Clear
Treatment Number: 1
Solution: Beta-HD
Solution: Activ-4
Procedure: Peel
Procedure: Extend and Protect
Consent: Written consent obtained, risks reviewed including but not limited to crusting, scabbing, blistering, scarring, darker or lighter pigmentary change, bruising, and/or incomplete response.
Procedure: Extraction
Vacuum Pressure Low Setting (Will Not Render If Set To 0): 16
Tip Override

## 2024-10-31 ENCOUNTER — APPOINTMENT (RX ONLY)
Dept: URBAN - METROPOLITAN AREA CLINIC 154 | Facility: CLINIC | Age: 69
Setting detail: DERMATOLOGY
End: 2024-10-31

## 2024-10-31 DIAGNOSIS — Z41.9 ENCOUNTER FOR PROCEDURE FOR PURPOSES OTHER THAN REMEDYING HEALTH STATE, UNSPECIFIED: ICD-10-CM

## 2024-10-31 PROCEDURE — ? DERMAPLANE

## 2024-10-31 PROCEDURE — ? SIGNATURE HYDRAFACIAL

## 2024-10-31 ASSESSMENT — LOCATION ZONE DERM: LOCATION ZONE: FACE

## 2024-10-31 ASSESSMENT — LOCATION SIMPLE DESCRIPTION DERM
LOCATION SIMPLE: LEFT CHEEK
LOCATION SIMPLE: RIGHT CHEEK

## 2024-10-31 ASSESSMENT — LOCATION DETAILED DESCRIPTION DERM
LOCATION DETAILED: LEFT CENTRAL MALAR CHEEK
LOCATION DETAILED: RIGHT CENTRAL MALAR CHEEK

## 2024-10-31 NOTE — PROCEDURE: SIGNATURE HYDRAFACIAL
Vacuum Pressure Low Setting (Will Not Render If Set To 0): 0
Tip: Hydropeel Tip, Clear
Procedure: Extend and Protect
Treatment Number: 1
Procedure: Fusion
Solution Override: retinol
Tip: Hydropeel Tip, Orange Aggression
Procedure: Extraction
Consent: Written consent obtained, risks reviewed including but not limited to crusting, scabbing, blistering, scarring, darker or lighter pigmentary change, bruising, and/or incomplete response.
Post-Care Instructions: I reviewed with the patient in detail post-care instructions. Patient should stay away from the sun and wear sun protection until treated areas are fully healed.
Solution: Activ-4
Tip: Hydropeel Tip, Teal
Vacuum Pressure Low Setting (Will Not Render If Set To 0): 16
Tip Override
Solution: Beta-HD
Solution Override
Solution: GlySal 15%
Location: face
Procedure: Boost
Indication: skin texture
Procedure: Peel
Procedure: Exfoliation

## 2024-10-31 NOTE — PROCEDURE: DERMAPLANE
There are no Wet Read(s) to document.
Treatment Areas: face and neck
Detail Level: Generalized
Post-Care Instructions: I reviewed with the patient in detail post-care instructions.
Pre-Procedure Text: The patient was placed in a recumbant position on the procedure table. Hydracool applied post procedure
Treatment Area Prep: alcohol
Post-Procedure Instructions: Following the dermaplane procedure, Oxymist treatment was applied to the treatment areas. Moisturizer and SPF was applied.
Blade: 10 blade scalpel

## 2024-11-15 ENCOUNTER — APPOINTMENT (RX ONLY)
Dept: URBAN - METROPOLITAN AREA CLINIC 154 | Facility: CLINIC | Age: 69
Setting detail: DERMATOLOGY
End: 2024-11-15

## 2024-11-15 DIAGNOSIS — Z41.9 ENCOUNTER FOR PROCEDURE FOR PURPOSES OTHER THAN REMEDYING HEALTH STATE, UNSPECIFIED: ICD-10-CM

## 2024-11-15 PROCEDURE — ? LASER HAIR REMOVAL

## 2024-11-15 NOTE — PROCEDURE: LASER HAIR REMOVAL
Number Of Prepaid Treatments (Will Not Render If 0): 0
Fluence (Will Not Render If 0): 20
Consent: Written consent obtained, risks reviewed including but not limited to crusting, scabbing, blistering, scarring, darker or lighter pigmentary change, paradoxical hair regrowth, incomplete removal of hair and infection.
Eye Shield Text: Given the treatment area eye shields were inserted prior to treatment.
Cooling: DCD 40/30
Pre-Procedure: Prior to proceeding the treatment areas were cleaned and all present put on their eye protection.
Post-Procedure Care: Immediate endpoint: perifollicular erythema and edema. Laser Gel or Extreme Protect were applied post laser. Post care reviewed with patient.
Spot Size: 1.5 mm
Were Eye Shields Employed?: No
Device Serial Number (Optional): [id number]
Spot Size: 18 mm
Laser Type: Alexandrite 755nm
Total Pulses: 46
Cooling: DCD setting
Tolerated Procedure (Optional): Tolerated Well
Detail Level: Generalized
Post-Care Instructions: I reviewed with the patient in detail post-care instructions. Patient should avoid sun for a minimum of 4 weeks before and after treatment.
Topical Anesthesia Type: 10% benzocaine, 3% lidocaine, 2% tetracaine, 0.01% phenylephrine
Fluence (Will Not Render If 0): 12
Total Pulses: 16
Pulse Duration (Include Units): 10
Cooling: DCD 30/20

## 2024-11-20 ENCOUNTER — APPOINTMENT (RX ONLY)
Dept: URBAN - METROPOLITAN AREA CLINIC 154 | Facility: CLINIC | Age: 69
Setting detail: DERMATOLOGY
End: 2024-11-20

## 2024-11-20 DIAGNOSIS — Z41.9 ENCOUNTER FOR PROCEDURE FOR PURPOSES OTHER THAN REMEDYING HEALTH STATE, UNSPECIFIED: ICD-10-CM

## 2024-11-20 PROCEDURE — ? SOFWAVE

## 2024-11-20 NOTE — PROCEDURE: SOFWAVE
# Of Treatments In Package: 0
Topical Anesthetic #1: lidocaine
Post-Procedure Care: Normal mild skin redness was seen.
Topical Anesthesia?: yes
Energy (Joules): 3.4
Pulse Duration (In Sec): 5.0
Post-Care Instructions: I reviewed with the patient in detail post-care instructions.
Topical % #2: 7
Consent: Written consent obtained, risks reviewed including but not limited to crusting, scabbing, blistering, scarring, darker or lighter pigmentary change, need for addtional treatments, and incomplete result.
Topical % #1: 23
Post Cooling (In Sec): 2.0
Detail Level: Detailed
Render Post-Care In The Note: No
Device: [employer]
Topical Anesthetic #2: tetracaine
Pre-Procedure: Prior to proceeding the treatment areas were cleaned and gel was applied.

## 2024-12-13 ENCOUNTER — APPOINTMENT (OUTPATIENT)
Dept: URBAN - METROPOLITAN AREA CLINIC 154 | Facility: CLINIC | Age: 69
Setting detail: DERMATOLOGY
End: 2024-12-13

## 2024-12-13 DIAGNOSIS — Z41.9 ENCOUNTER FOR PROCEDURE FOR PURPOSES OTHER THAN REMEDYING HEALTH STATE, UNSPECIFIED: ICD-10-CM

## 2024-12-13 PROCEDURE — ? DERMAPLANE

## 2024-12-13 PROCEDURE — ? SIGNATURE HYDRAFACIAL

## 2024-12-13 ASSESSMENT — LOCATION DETAILED DESCRIPTION DERM
LOCATION DETAILED: LEFT MEDIAL MALAR CHEEK
LOCATION DETAILED: RIGHT MEDIAL MALAR CHEEK

## 2024-12-13 ASSESSMENT — LOCATION SIMPLE DESCRIPTION DERM
LOCATION SIMPLE: RIGHT CHEEK
LOCATION SIMPLE: LEFT CHEEK

## 2024-12-13 ASSESSMENT — LOCATION ZONE DERM: LOCATION ZONE: FACE

## 2024-12-13 NOTE — PROCEDURE: DERMAPLANE
Treatment Areas: face and neck
Blade: 10 blade scalpel
Treatment Area Prep: alcohol
Post-Care Instructions: I reviewed with the patient in detail post-care instructions.
Pre-Procedure Text: The patient was placed in a recumbant position on the procedure table. Hydracool applied post procedure
Detail Level: Generalized
Post-Procedure Instructions: Following the dermaplane procedure, Oxymist treatment was applied to the treatment areas. Moisturizer and SPF was applied.

## 2024-12-13 NOTE — PROCEDURE: SIGNATURE HYDRAFACIAL
Tip: Hydropeel Tip, Teal
Tip: Hydropeel Tip, Orange Aggression
Solution Override
Procedure: Exfoliation
Procedure: Extend and Protect
Tip: Hydropeel Tip, Clear
Vacuum Pressure Low Setting (Will Not Render If Set To 0): 0
Solution Override: retinol
Treatment Number: 1
Procedure: Extraction
Post-Care Instructions: I reviewed with the patient in detail post-care instructions. Patient should stay away from the sun and wear sun protection until treated areas are fully healed.
Vacuum Pressure Low Setting (Will Not Render If Set To 0): 16
Solution: Activ-4
Consent: Written consent obtained, risks reviewed including but not limited to crusting, scabbing, blistering, scarring, darker or lighter pigmentary change, bruising, and/or incomplete response.
Indication: skin texture
Solution: GlySal 15%
Procedure: Peel
Location: face
Tip Override
Solution: Beta-HD
Procedure: Boost
Procedure: Fusion

## 2025-01-10 ENCOUNTER — APPOINTMENT (OUTPATIENT)
Dept: URBAN - METROPOLITAN AREA CLINIC 154 | Facility: CLINIC | Age: 70
Setting detail: DERMATOLOGY
End: 2025-01-10

## 2025-01-10 DIAGNOSIS — Z41.9 ENCOUNTER FOR PROCEDURE FOR PURPOSES OTHER THAN REMEDYING HEALTH STATE, UNSPECIFIED: ICD-10-CM

## 2025-01-10 PROCEDURE — ? DERMAPLANE

## 2025-01-10 PROCEDURE — ? SIGNATURE HYDRAFACIAL

## 2025-01-10 ASSESSMENT — LOCATION DETAILED DESCRIPTION DERM
LOCATION DETAILED: LEFT MEDIAL MALAR CHEEK
LOCATION DETAILED: RIGHT CENTRAL MALAR CHEEK

## 2025-01-10 ASSESSMENT — LOCATION ZONE DERM: LOCATION ZONE: FACE

## 2025-01-10 ASSESSMENT — LOCATION SIMPLE DESCRIPTION DERM
LOCATION SIMPLE: RIGHT CHEEK
LOCATION SIMPLE: LEFT CHEEK

## 2025-01-10 NOTE — PROCEDURE: SIGNATURE HYDRAFACIAL
Vacuum Pressure Low Setting (Will Not Render If Set To 0): 0
Procedure: Boost
Solution Override
Solution: GlySal 15%
Indication: skin texture
Vacuum Pressure Low Setting (Will Not Render If Set To 0): 16
Location: face
Procedure: Peel
Tip Override
Solution: Beta-HD
Solution: Activ-4
Tip: Hydropeel Tip, Clear
Post-Care Instructions: I reviewed with the patient in detail post-care instructions. Patient should stay away from the sun and wear sun protection until treated areas are fully healed.
Solution Override: retinol
Consent: Written consent obtained, risks reviewed including but not limited to crusting, scabbing, blistering, scarring, darker or lighter pigmentary change, bruising, and/or incomplete response.
Procedure: Extraction
Tip: Hydropeel Tip, Teal
Tip: Hydropeel Tip, Orange Aggression
Procedure: Exfoliation
Procedure: Fusion
Procedure: Extend and Protect
Treatment Number: 1

## 2025-01-10 NOTE — PROCEDURE: DERMAPLANE
Post-Care Instructions: I reviewed with the patient in detail post-care instructions.
Treatment Area Prep: alcohol
Treatment Areas: face and neck
Post-Procedure Instructions: Following the dermaplane procedure, Oxymist treatment was applied to the treatment areas. Moisturizer and SPF was applied.
Detail Level: Generalized
Blade: 10 blade scalpel
Pre-Procedure Text: The patient was placed in a recumbant position on the procedure table. Hydracool applied post procedure

## 2025-01-24 ENCOUNTER — APPOINTMENT (OUTPATIENT)
Dept: URBAN - METROPOLITAN AREA CLINIC 154 | Facility: CLINIC | Age: 70
Setting detail: DERMATOLOGY
End: 2025-01-24

## 2025-01-24 DIAGNOSIS — Z41.9 ENCOUNTER FOR PROCEDURE FOR PURPOSES OTHER THAN REMEDYING HEALTH STATE, UNSPECIFIED: ICD-10-CM

## 2025-01-24 PROCEDURE — ? SCITON CLEARV LASER

## 2025-01-24 ASSESSMENT — LOCATION SIMPLE DESCRIPTION DERM
LOCATION SIMPLE: LEFT THIGH
LOCATION SIMPLE: RIGHT THIGH
LOCATION SIMPLE: RIGHT PRETIBIAL REGION
LOCATION SIMPLE: LEFT PRETIBIAL REGION

## 2025-01-24 ASSESSMENT — LOCATION DETAILED DESCRIPTION DERM
LOCATION DETAILED: RIGHT ANTERIOR DISTAL THIGH
LOCATION DETAILED: LEFT ANTERIOR DISTAL THIGH
LOCATION DETAILED: LEFT PROXIMAL PRETIBIAL REGION
LOCATION DETAILED: RIGHT PROXIMAL PRETIBIAL REGION

## 2025-01-24 ASSESSMENT — LOCATION ZONE DERM: LOCATION ZONE: LEG

## 2025-01-24 NOTE — PROCEDURE: SCITON CLEARV LASER
Add Another Setting?: no
Treatment Number (Will Not Render If 0): 0
Detail Level: Simple
Post-Care Instructions: I reviewed with the patient in detail post-care instructions. Patient should avoid sunlight and wear sun protection.
Pulse Width (Optional- Include Units): 15
Laser Frequency (Include Units): 1
Consent: Written consent obtained, risks reviewed including but not limited to crusting, scabbing, blistering, scarring, darker or lighter pigmentary change, systemic reactions, ulceration, incidental hair removal, bruising, and/or incomplete removal.
Vessel Size/Spot Size (Optional Include Units): <3
Fluence (Optional Include Units): 110

## 2025-02-21 ENCOUNTER — APPOINTMENT (OUTPATIENT)
Dept: URBAN - METROPOLITAN AREA CLINIC 154 | Facility: CLINIC | Age: 70
Setting detail: DERMATOLOGY
End: 2025-02-21

## 2025-02-21 DIAGNOSIS — Z41.9 ENCOUNTER FOR PROCEDURE FOR PURPOSES OTHER THAN REMEDYING HEALTH STATE, UNSPECIFIED: ICD-10-CM

## 2025-02-21 PROCEDURE — ? DERMAPLANE

## 2025-02-21 PROCEDURE — ? SIGNATURE HYDRAFACIAL

## 2025-02-21 ASSESSMENT — LOCATION SIMPLE DESCRIPTION DERM
LOCATION SIMPLE: LEFT CHEEK
LOCATION SIMPLE: RIGHT CHEEK

## 2025-02-21 ASSESSMENT — LOCATION DETAILED DESCRIPTION DERM
LOCATION DETAILED: RIGHT CENTRAL MALAR CHEEK
LOCATION DETAILED: LEFT CENTRAL MALAR CHEEK

## 2025-02-21 ASSESSMENT — LOCATION ZONE DERM: LOCATION ZONE: FACE

## 2025-02-21 NOTE — PROCEDURE: DERMAPLANE
Treatment Area Prep: alcohol
Treatment Areas: face and neck
Post-Care Instructions: I reviewed with the patient in detail post-care instructions.
Blade: 10 blade scalpel
Detail Level: Generalized
Post-Procedure Instructions: Following the dermaplane procedure, Oxymist treatment was applied to the treatment areas. Moisturizer and SPF was applied.
Pre-Procedure Text: The patient was placed in a recumbant position on the procedure table. Hydracool applied post procedure

## 2025-02-21 NOTE — PROCEDURE: SIGNATURE HYDRAFACIAL
Consent: Written consent obtained, risks reviewed including but not limited to crusting, scabbing, blistering, scarring, darker or lighter pigmentary change, bruising, and/or incomplete response.
Post-Care Instructions: I reviewed with the patient in detail post-care instructions. Patient should stay away from the sun and wear sun protection until treated areas are fully healed.
Vacuum Pressure Low Setting (Will Not Render If Set To 0): 0
Tip: Hydropeel Tip, Orange Aggression
Tip: Hydropeel Tip, Teal
Solution Override
Procedure: Boost
Procedure: Exfoliation
Tip: Hydropeel Tip, Clear
Solution: Beta-HD
Location: face
Solution Override: retinol
Solution: Activ-4
Indication: skin texture
Procedure: Peel
Vacuum Pressure Low Setting (Will Not Render If Set To 0): 16
Treatment Number: 1
Procedure: Extend and Protect
Solution: GlySal 15%
Tip Override
Procedure: Fusion
Procedure: Extraction

## 2025-03-11 ENCOUNTER — APPOINTMENT (OUTPATIENT)
Dept: URBAN - METROPOLITAN AREA CLINIC 154 | Facility: CLINIC | Age: 70
Setting detail: DERMATOLOGY
End: 2025-03-11

## 2025-04-09 ENCOUNTER — APPOINTMENT (OUTPATIENT)
Dept: URBAN - METROPOLITAN AREA CLINIC 154 | Facility: CLINIC | Age: 70
Setting detail: DERMATOLOGY
End: 2025-04-09

## 2025-04-09 DIAGNOSIS — Z41.9 ENCOUNTER FOR PROCEDURE FOR PURPOSES OTHER THAN REMEDYING HEALTH STATE, UNSPECIFIED: ICD-10-CM

## 2025-04-09 PROCEDURE — ? SCITON BBL HERO

## 2025-04-09 PROCEDURE — ? SCITON MOXI

## 2025-04-09 NOTE — PROCEDURE: SCITON BBL HERO
Spot Size: Finesse Adapter Size: 15 x 15 mm square
Rate (Hz): 4
Anesthesia Type: 1% lidocaine with epinephrine
Spot Size: Finesse Adapter Size: 7 mm round
Pulse Width Units: milliseconds
Filter: 515nm Filter
Filter: 590ST Filter
Fluence (J/Cm2) - Will Not Render If 0: 5
Pulse Width - Will Not Render If 0: 3
Add Setting 3?: no
Pulse Width - Will Not Render If 0: 0
Temp (C): 20
Post-Care Instructions: I reviewed with the patient in detail post-care instructions. Patient should avoid sun exposure before and after treatment.  Patient should wear sunscreen.
Temp (C): 25
Add Setting 2?: yes
Filter: 560nm Filter
Total Pulses (Optional): 400
Procedure Note: After applying gel and applying protective eyeware, treatment was administered using the setting parameters listed above.
Spot Size: Finesse Adapter Size: 15 x 45 mm (No Finesse Adapter)
Temp (C): 30
Detail Level: Simple
Consent: Written consent obtained.  Risks reviewed including but not limited to crusting, scabbing, blistering, scarring, darker or lighter pigmentary change, and incomplete clearance.

## 2025-04-09 NOTE — PROCEDURE: SCITON MOXI
Treatment Number: 1
Total Coverage (%): 20
Laser Type: Fractionated 1927nm, thulium laser
Post-Care Instructions: I reviewed with the patient in detail post-care instructions.  Recommended diligent sun protection and sun avoidance
Number Of Passes: 6
Detail Level: Zone
External Cooling Fan Speed: 5
Pre=procedure Text: After consent was obtained, the treatment areas were cleaned and treated using the parameters noted above.
Consent: Written consent obtained.  Risks were reviewed including but not limited to crusting, scabbing, blistering, scarring, darker or lighter pigmentary change, incomplete improvement, prolonged erythema and facial swelling, infection, and bleeding.
Level 3

## 2025-04-17 ENCOUNTER — APPOINTMENT (OUTPATIENT)
Dept: URBAN - METROPOLITAN AREA CLINIC 154 | Facility: CLINIC | Age: 70
Setting detail: DERMATOLOGY
End: 2025-04-17

## 2025-04-17 DIAGNOSIS — Z41.9 ENCOUNTER FOR PROCEDURE FOR PURPOSES OTHER THAN REMEDYING HEALTH STATE, UNSPECIFIED: ICD-10-CM

## 2025-04-17 PROCEDURE — ? SIGNATURE HYDRAFACIAL

## 2025-04-17 PROCEDURE — ? DERMAPLANE

## 2025-04-17 ASSESSMENT — LOCATION ZONE DERM: LOCATION ZONE: FACE

## 2025-04-17 ASSESSMENT — LOCATION SIMPLE DESCRIPTION DERM
LOCATION SIMPLE: LEFT CHEEK
LOCATION SIMPLE: RIGHT CHEEK

## 2025-04-17 ASSESSMENT — LOCATION DETAILED DESCRIPTION DERM
LOCATION DETAILED: LEFT INFERIOR MEDIAL MALAR CHEEK
LOCATION DETAILED: RIGHT CENTRAL MALAR CHEEK

## 2025-04-17 NOTE — PROCEDURE: DERMAPLANE
Blade: 10 blade scalpel
Post-Procedure Instructions: Following the dermaplane procedure, Oxymist treatment was applied to the treatment areas. Moisturizer and SPF was applied.
Treatment Area Prep: alcohol
Treatment Areas: face and neck
Post-Care Instructions: I reviewed with the patient in detail post-care instructions.
Pre-Procedure Text: The patient was placed in a recumbant position on the procedure table. Hydracool applied post procedure
Detail Level: Generalized

## 2025-04-17 NOTE — PROCEDURE: SIGNATURE HYDRAFACIAL
Tip: Hydropeel Tip, Clear
Vacuum Pressure Low Setting (Will Not Render If Set To 0): 0
Treatment Number: 1
Tip Override
Tip: Hydropeel Tip, Orange Aggression
Procedure: Fusion
Solution Override
Procedure: Extend and Protect
Solution: GlySal 15%
Tip: Hydropeel Tip, Teal
Consent: Written consent obtained, risks reviewed including but not limited to crusting, scabbing, blistering, scarring, darker or lighter pigmentary change, bruising, and/or incomplete response.
Procedure: Extraction
Procedure: Peel
Procedure: Boost
Vacuum Pressure Low Setting (Will Not Render If Set To 0): 16
Post-Care Instructions: I reviewed with the patient in detail post-care instructions. Patient should stay away from the sun and wear sun protection until treated areas are fully healed.
Solution: Beta-HD
Indication: skin texture
Procedure: Exfoliation
Location: face
Solution Override: retinol
Solution: Activ-4

## 2025-05-13 ENCOUNTER — APPOINTMENT (OUTPATIENT)
Dept: URBAN - METROPOLITAN AREA CLINIC 156 | Facility: CLINIC | Age: 70
Setting detail: DERMATOLOGY
End: 2025-05-13

## 2025-05-13 DIAGNOSIS — Z41.9 ENCOUNTER FOR PROCEDURE FOR PURPOSES OTHER THAN REMEDYING HEALTH STATE, UNSPECIFIED: ICD-10-CM

## 2025-05-13 PROCEDURE — ? RESTYLANE DEFYNE INJECTION

## 2025-05-13 PROCEDURE — ? DYSPORT

## 2025-05-13 NOTE — PROCEDURE: RESTYLANE DEFYNE INJECTION
Additional Area 1 Volume In Cc: 0
Lot #: [id number]
Procedural Text: The filler was administered to the treatment areas noted above.
Filler: Restylane Defyne
Consent: Written consent obtained. Risks include but not limited to bruising, beading, irregular texture, ulceration, infection, allergic reaction, scar formation, incomplete augmentation, temporary nature, procedural pain.
Include Cannula Information In Note?: No
Nasolabial Folds Filler Volume In Cc: 0.3
Detail Level: Detailed
Additional Anesthesia Volume In Cc: 6
Map Statement: See Attached Map for Complete Details
Post-Care Instructions: Patient instructed to apply ice to reduce swelling.
Expiration Date (Month Year): 07/31/2026
Number Of Syringes (Required For Inventory): 1
Anesthesia Volume In Cc: 0.5
Marionette Lines Filler Volume In Cc: 0.7

## 2025-05-13 NOTE — PROCEDURE: DYSPORT
Left Periorbital Skin Units: 0
Show Orbicularis Oculi Units: Yes
Consent: Written consent obtained. Risks include but not limited to lid/brow ptosis, bruising, swelling, diplopia, temporary effect, incomplete chemical denervation.
Dilution (U/0.1 Cc): 7.5
Show Right And Left Brow Units: No
Post-Care Instructions: Patient instructed to not lie down for 4 hours and limit physical activity for 24 hours.
Detail Level: Detailed

## 2025-05-30 ENCOUNTER — APPOINTMENT (OUTPATIENT)
Dept: URBAN - METROPOLITAN AREA CLINIC 154 | Facility: CLINIC | Age: 70
Setting detail: DERMATOLOGY
End: 2025-05-30

## 2025-05-30 DIAGNOSIS — Z41.9 ENCOUNTER FOR PROCEDURE FOR PURPOSES OTHER THAN REMEDYING HEALTH STATE, UNSPECIFIED: ICD-10-CM

## 2025-05-30 PROCEDURE — ? DERMAPLANE

## 2025-05-30 PROCEDURE — ? SIGNATURE HYDRAFACIAL

## 2025-05-30 ASSESSMENT — LOCATION DETAILED DESCRIPTION DERM
LOCATION DETAILED: LEFT INFERIOR CENTRAL MALAR CHEEK
LOCATION DETAILED: RIGHT INFERIOR CENTRAL MALAR CHEEK

## 2025-05-30 ASSESSMENT — LOCATION SIMPLE DESCRIPTION DERM
LOCATION SIMPLE: RIGHT CHEEK
LOCATION SIMPLE: LEFT CHEEK

## 2025-05-30 ASSESSMENT — LOCATION ZONE DERM: LOCATION ZONE: FACE

## 2025-05-30 NOTE — PROCEDURE: DERMAPLANE
Post-Care Instructions: I reviewed with the patient in detail post-care instructions.
Detail Level: Generalized
Treatment Area Prep: alcohol
Treatment Areas: face and neck
Blade: 10 blade scalpel
Post-Procedure Instructions: Following the dermaplane procedure, Oxymist treatment was applied to the treatment areas. Moisturizer and SPF was applied.
Pre-Procedure Text: The patient was placed in a recumbant position on the procedure table. Hydracool applied post procedure

## 2025-05-30 NOTE — PROCEDURE: SIGNATURE HYDRAFACIAL
Procedure: Extend and Protect
Number Of Passes: 0
Procedure: Extraction
Tip: Hydropeel Tip, Teal
Procedure: Fusion
Procedure: Boost
Consent: Written consent obtained, risks reviewed including but not limited to crusting, scabbing, blistering, scarring, darker or lighter pigmentary change, bruising, and/or incomplete response.
Solution Override
Location: face
Indication: skin texture
Post-Care Instructions: I reviewed with the patient in detail post-care instructions. Patient should stay away from the sun and wear sun protection until treated areas are fully healed.
Vacuum Pressure Low Setting (Will Not Render If Set To 0): 16
Procedure: Peel
Solution Override: retinol
Solution: Beta-HD
Tip: Hydropeel Tip, Orange Aggression
Solution: Activ-4
Tip: Hydropeel Tip, Clear
Solution: GlySal 15%
Tip Override
Procedure: Exfoliation
Treatment Number: 1

## 2025-07-02 ENCOUNTER — APPOINTMENT (OUTPATIENT)
Dept: URBAN - METROPOLITAN AREA CLINIC 154 | Facility: CLINIC | Age: 70
Setting detail: DERMATOLOGY
End: 2025-07-02

## 2025-07-02 DIAGNOSIS — Z41.9 ENCOUNTER FOR PROCEDURE FOR PURPOSES OTHER THAN REMEDYING HEALTH STATE, UNSPECIFIED: ICD-10-CM

## 2025-07-02 PROCEDURE — ? SCITON BBL HEROIC

## 2025-07-02 PROCEDURE — ? SCITON MOXI

## 2025-07-02 NOTE — PROCEDURE: SCITON MOXI
External Cooling Fan Speed: 5
Level 3
Total Coverage (%): 15
Detail Level: Zone
Post-Care Instructions: I reviewed with the patient in detail post-care instructions.  Recommended diligent sun protection and sun avoidance
Pre=procedure Text: After consent was obtained, the treatment areas were cleaned and treated using the parameters noted above.
Consent: Written consent obtained.  Risks were reviewed including but not limited to crusting, scabbing, blistering, scarring, darker or lighter pigmentary change, incomplete improvement, prolonged erythema and facial swelling, infection, and bleeding.
Laser Type: Fractionated 1927nm, thulium laser
Treatment Number: 1

## 2025-07-02 NOTE — PROCEDURE: SCITON BBL HEROIC
Spot Size: Finesse Adapter Size: 15 x 15 mm square
Spot Size: Finesse Adapter Size: 15 x 45 mm (No Finesse Adapter)
Overlap: 20
Consent: Written consent obtained.  Risks reviewed including but not limited to crusting, scabbing, blistering, scarring, darker or lighter pigmentary change, and incomplete clearance.
Fluence (J/Cm2) - Will Not Render If 0: 0
Procedure Note: After applying gel and applying protective eyeware, treatment was administered using the setting parameters listed above.
Anesthesia Type: 1% lidocaine with epinephrine
Post-Care Instructions: I reviewed with the patient in detail post-care instructions. Patient should avoid sun exposure before and after treatment.  Patient should wear sunscreen.
Fluence (J/Cm2) - Will Not Render If 0: 2
Add Setting 3?: no
Detail Level: Simple
Total Pulses: 200
Pulse Width Units: milliseconds
Pulse Width - Will Not Render If 0: 3

## 2025-08-27 ENCOUNTER — APPOINTMENT (OUTPATIENT)
Dept: URBAN - METROPOLITAN AREA CLINIC 154 | Facility: CLINIC | Age: 70
Setting detail: DERMATOLOGY
End: 2025-08-27